# Patient Record
Sex: MALE | Race: BLACK OR AFRICAN AMERICAN | NOT HISPANIC OR LATINO | Employment: PART TIME | URBAN - METROPOLITAN AREA
[De-identification: names, ages, dates, MRNs, and addresses within clinical notes are randomized per-mention and may not be internally consistent; named-entity substitution may affect disease eponyms.]

---

## 2020-03-15 ENCOUNTER — APPOINTMENT (OUTPATIENT)
Dept: CT IMAGING | Facility: HOSPITAL | Age: 40
End: 2020-03-15

## 2020-03-15 ENCOUNTER — HOSPITAL ENCOUNTER (EMERGENCY)
Facility: HOSPITAL | Age: 40
Discharge: HOME | End: 2020-03-16
Attending: EMERGENCY MEDICINE | Admitting: EMERGENCY MEDICINE

## 2020-03-15 DIAGNOSIS — R20.2 PARESTHESIA OF LEFT ARM AND LEG: Primary | ICD-10-CM

## 2020-03-15 LAB
BASOPHILS # BLD AUTO: 0.05 10*3/MM3 (ref 0–0.2)
BASOPHILS NFR BLD AUTO: 1 % (ref 0–1.5)
DEPRECATED RDW RBC AUTO: 42.1 FL (ref 37–54)
EOSINOPHIL # BLD AUTO: 0.17 10*3/MM3 (ref 0–0.4)
EOSINOPHIL NFR BLD AUTO: 3.5 % (ref 0.3–6.2)
ERYTHROCYTE [DISTWIDTH] IN BLOOD BY AUTOMATED COUNT: 13 % (ref 12.3–15.4)
HCT VFR BLD AUTO: 38.5 % (ref 37.5–51)
HGB BLD-MCNC: 12.6 G/DL (ref 13–17.7)
IMM GRANULOCYTES # BLD AUTO: 0.01 10*3/MM3 (ref 0–0.05)
IMM GRANULOCYTES NFR BLD AUTO: 0.2 % (ref 0–0.5)
LYMPHOCYTES # BLD AUTO: 1.38 10*3/MM3 (ref 0.7–3.1)
LYMPHOCYTES NFR BLD AUTO: 28.2 % (ref 19.6–45.3)
MCH RBC QN AUTO: 28.8 PG (ref 26.6–33)
MCHC RBC AUTO-ENTMCNC: 32.7 G/DL (ref 31.5–35.7)
MCV RBC AUTO: 88.1 FL (ref 79–97)
MONOCYTES # BLD AUTO: 0.58 10*3/MM3 (ref 0.1–0.9)
MONOCYTES NFR BLD AUTO: 11.9 % (ref 5–12)
NEUTROPHILS # BLD AUTO: 2.7 10*3/MM3 (ref 1.7–7)
NEUTROPHILS NFR BLD AUTO: 55.2 % (ref 42.7–76)
NRBC BLD AUTO-RTO: 0 /100 WBC (ref 0–0.2)
PLATELET # BLD AUTO: 312 10*3/MM3 (ref 140–450)
PMV BLD AUTO: 9.6 FL (ref 6–12)
RBC # BLD AUTO: 4.37 10*6/MM3 (ref 4.14–5.8)
WBC NRBC COR # BLD: 4.89 10*3/MM3 (ref 3.4–10.8)

## 2020-03-15 PROCEDURE — 99284 EMERGENCY DEPT VISIT MOD MDM: CPT

## 2020-03-15 PROCEDURE — 80053 COMPREHEN METABOLIC PANEL: CPT | Performed by: EMERGENCY MEDICINE

## 2020-03-15 PROCEDURE — 85025 COMPLETE CBC W/AUTO DIFF WBC: CPT | Performed by: EMERGENCY MEDICINE

## 2020-03-15 RX ORDER — SODIUM CHLORIDE 0.9 % (FLUSH) 0.9 %
10 SYRINGE (ML) INJECTION AS NEEDED
Status: DISCONTINUED | OUTPATIENT
Start: 2020-03-15 | End: 2020-03-16 | Stop reason: HOSPADM

## 2020-03-16 ENCOUNTER — APPOINTMENT (OUTPATIENT)
Dept: CT IMAGING | Facility: HOSPITAL | Age: 40
End: 2020-03-16

## 2020-03-16 VITALS
BODY MASS INDEX: 24.38 KG/M2 | HEART RATE: 100 BPM | DIASTOLIC BLOOD PRESSURE: 88 MMHG | SYSTOLIC BLOOD PRESSURE: 121 MMHG | HEIGHT: 72 IN | RESPIRATION RATE: 17 BRPM | WEIGHT: 180 LBS | OXYGEN SATURATION: 100 % | TEMPERATURE: 98.9 F

## 2020-03-16 LAB
ALBUMIN SERPL-MCNC: 4.2 G/DL (ref 3.5–5.2)
ALBUMIN/GLOB SERPL: 2.2 G/DL
ALP SERPL-CCNC: 55 U/L (ref 39–117)
ALT SERPL W P-5'-P-CCNC: 13 U/L (ref 1–41)
ANION GAP SERPL CALCULATED.3IONS-SCNC: 11.4 MMOL/L (ref 5–15)
AST SERPL-CCNC: 18 U/L (ref 1–40)
BILIRUB SERPL-MCNC: <0.2 MG/DL (ref 0.2–1.2)
BUN BLD-MCNC: 12 MG/DL (ref 6–20)
BUN/CREAT SERPL: 14.5 (ref 7–25)
CALCIUM SPEC-SCNC: 8.6 MG/DL (ref 8.6–10.5)
CHLORIDE SERPL-SCNC: 104 MMOL/L (ref 98–107)
CO2 SERPL-SCNC: 24.6 MMOL/L (ref 22–29)
CREAT BLD-MCNC: 0.83 MG/DL (ref 0.76–1.27)
GFR SERPL CREATININE-BSD FRML MDRD: 124 ML/MIN/1.73
GLOBULIN UR ELPH-MCNC: 1.9 GM/DL
GLUCOSE BLD-MCNC: 92 MG/DL (ref 65–99)
POTASSIUM BLD-SCNC: 3.7 MMOL/L (ref 3.5–5.2)
PROT SERPL-MCNC: 6.1 G/DL (ref 6–8.5)
SODIUM BLD-SCNC: 140 MMOL/L (ref 136–145)

## 2020-03-16 PROCEDURE — 70496 CT ANGIOGRAPHY HEAD: CPT

## 2020-03-16 PROCEDURE — 70498 CT ANGIOGRAPHY NECK: CPT

## 2020-03-16 PROCEDURE — 0 IOPAMIDOL PER 1 ML: Performed by: EMERGENCY MEDICINE

## 2020-03-16 RX ADMIN — IOPAMIDOL 95 ML: 755 INJECTION, SOLUTION INTRAVENOUS at 00:42

## 2020-03-16 NOTE — ED TRIAGE NOTES
"Pt ED with c/o headache and L sided tingling/weakness for 2 weeks.  Pt reports sensation difference between extremeties stating L arm feels \"silky\".  No facial droop, slurred speech noted, equal hand grasp bilateral.   "

## 2020-03-16 NOTE — ED PROVIDER NOTES
" EMERGENCY DEPARTMENT ENCOUNTER    CHIEF COMPLAINT  Chief Complaint: Numbness  History given by: Patient  History limited by:   Room Number: 14/14  PMD: Provider, No Known      HPI:  Pt is a 39 y.o. male who presents complaining of L sided extremity numbness that began 2 weeks ago. Prior to onset of sxs, he states that he had a massage done. During the onset of sxs he also reports a L sided HA and had some L sided facial droop at that time. He denies any weakness. Pt describes the numbness as a \"tingling\" sensation. He reports that sxs have improved, but are still present.       PAST MEDICAL HISTORY  Active Ambulatory Problems     Diagnosis Date Noted   • No Active Ambulatory Problems     Resolved Ambulatory Problems     Diagnosis Date Noted   • No Resolved Ambulatory Problems     No Additional Past Medical History       PAST SURGICAL HISTORY  History reviewed. No pertinent surgical history.    FAMILY HISTORY  No family history on file.    SOCIAL HISTORY  Social History     Socioeconomic History   • Marital status: Single     Spouse name: Not on file   • Number of children: Not on file   • Years of education: Not on file   • Highest education level: Not on file       ALLERGIES  Patient has no known allergies.    REVIEW OF SYSTEMS  Review of Systems   Constitutional: Negative for activity change, appetite change and fever.   HENT: Negative for congestion and sore throat.    Eyes: Negative.    Respiratory: Negative for cough and shortness of breath.    Cardiovascular: Negative for chest pain and leg swelling.   Gastrointestinal: Negative for abdominal pain, diarrhea and vomiting.   Endocrine: Negative.    Genitourinary: Negative for decreased urine volume and dysuria.   Musculoskeletal: Negative for neck pain.   Skin: Negative for rash and wound.   Allergic/Immunologic: Negative.    Neurological: Positive for numbness and headaches. Negative for weakness.   Hematological: Negative.    Psychiatric/Behavioral: Negative. "    All other systems reviewed and are negative.      PHYSICAL EXAM  ED Triage Vitals [03/15/20 2248]   Temp Heart Rate Resp BP SpO2   98.9 °F (37.2 °C) 94 16 -- 98 %      Temp src Heart Rate Source Patient Position BP Location FiO2 (%)   Tympanic -- -- -- --       Physical Exam   Constitutional: He is oriented to person, place, and time. No distress.   HENT:   Head: Normocephalic and atraumatic.   Eyes: Pupils are equal, round, and reactive to light. EOM are normal.   Neck: Normal range of motion. Neck supple.   Cardiovascular: Normal rate, regular rhythm and normal heart sounds.   Pulmonary/Chest: Effort normal and breath sounds normal. No respiratory distress.   Abdominal: Soft. There is no tenderness. There is no rebound and no guarding.   Musculoskeletal: Normal range of motion. He exhibits no edema.   Neurological: He is alert and oriented to person, place, and time. He has normal sensation and normal strength.   Skin: Skin is warm and dry.   Psychiatric: Mood and affect normal.   Nursing note and vitals reviewed.      LAB RESULTS  Lab Results (last 24 hours)     Procedure Component Value Units Date/Time    CBC & Differential [[id number]] Collected:  03/15/20 2343    Specimen:  Blood Updated:  03/15/20 4851    Narrative:       The following orders were created for panel order CBC & Differential.  Procedure                               Abnormality         Status                     ---------                               -----------         ------                     CBC Auto Differential[[id number]]        Abnormal            Final result                 Please view results for these tests on the individual orders.    Comprehensive Metabolic Panel [[id number]]  (Abnormal) Collected:  03/15/20 2343    Specimen:  Blood Updated:  03/16/20 0013     Glucose 92 mg/dL      BUN 12 mg/dL      Creatinine 0.83 mg/dL      Sodium 140 mmol/L      Potassium 3.7 mmol/L      Chloride 104 mmol/L      CO2 24.6 mmol/L      Calcium  8.6 mg/dL      Total Protein 6.1 g/dL      Albumin 4.20 g/dL      ALT (SGPT) 13 U/L      AST (SGOT) 18 U/L      Alkaline Phosphatase 55 U/L      Total Bilirubin <0.2 mg/dL      eGFR   Amer 124 mL/min/1.73      Globulin 1.9 gm/dL      A/G Ratio 2.2 g/dL      BUN/Creatinine Ratio 14.5     Anion Gap 11.4 mmol/L     Narrative:       GFR Normal >60  Chronic Kidney Disease <60  Kidney Failure <15      CBC Auto Differential [[id number]]  (Abnormal) Collected:  03/15/20 2343    Specimen:  Blood Updated:  03/15/20 2351     WBC 4.89 10*3/mm3      RBC 4.37 10*6/mm3      Hemoglobin 12.6 g/dL      Hematocrit 38.5 %      MCV 88.1 fL      MCH 28.8 pg      MCHC 32.7 g/dL      RDW 13.0 %      RDW-SD 42.1 fl      MPV 9.6 fL      Platelets 312 10*3/mm3      Neutrophil % 55.2 %      Lymphocyte % 28.2 %      Monocyte % 11.9 %      Eosinophil % 3.5 %      Basophil % 1.0 %      Immature Grans % 0.2 %      Neutrophils, Absolute 2.70 10*3/mm3      Lymphocytes, Absolute 1.38 10*3/mm3      Monocytes, Absolute 0.58 10*3/mm3      Eosinophils, Absolute 0.17 10*3/mm3      Basophils, Absolute 0.05 10*3/mm3      Immature Grans, Absolute 0.01 10*3/mm3      nRBC 0.0 /100 WBC           I ordered the above labs and reviewed the results    RADIOLOGY  CT Angiogram Head   CT Angiogram Neck   CT ANGIOGRAM NECK, CT ANGIOGRAM HEAD     HISTORY: Stenosis, CVA,      COMPARISON: None..     TECHNIQUE:  Radiation dose reduction techniques were utilized, including  automated exposure control and exposure modulation based on body size.  Axial thin-section contrast enhanced CT angiogram images obtained from  the aortic arch through the calvarial vertex utilizing angiographic  technique. Multi projection 3-D MIP reformatted images were supplemented  and reviewed.     CERVICAL CAROTID CT ANGIOGRAM:     FINDINGS: Standard arch configuration. There is no significant carotid  bulb or bifurcation region plaque bilaterally. Vertebral arteries are  patent, left is  dominant.        NASCET criteria utilized in stenosis measurements.     IMPRESSION CERVICAL CAROTID CT ANGIOGRAM:     1. Findings as above, no significant bifurcation region stenosis.           CRANIAL CTA ANGIOGRAM:     FINDINGS:  Basilar artery is widely patent. Both posterior cerebral  arteries originate from the basilar and are unremarkable. The cavernous  internal carotid arteries are widely patent bilaterally. Bifurcations  are normal. Bilateral A-1 segments are ample and widely patent. M-1  branch vessels are widely patent. There is normal filling of the  trifurcation vessels. .          IMPRESSION CRANIAL CT ANGIOGRAM:    1. No significant stenosis or aneurysm identified.                I ordered the above noted radiological studies. Interpreted by radiologist. Reviewed by me in PACS.       PROCEDURES  Procedures      PROGRESS AND CONSULTS     11:19 PM  Pt is not a tPA candidate. Sxs began 2 weeks ago. CTA head and neck ordered. Blood work ordered.    1:46 AM  Rechecked with pt. Informed the pt of his imaging showing nothing remarkable and plan to discharge. Pt understands and agrees with plan. All concerns were addressed.        MEDICAL DECISION MAKING  Results were reviewed/discussed with the patient and they were also made aware of online access. Pt also made aware that some labs, such as cultures, will not be resulted during ER visit and follow up with PMD is necessary.     MDM  Number of Diagnoses or Management Options     Amount and/or Complexity of Data Reviewed  Review and summarize past medical records: yes (No previous records)           DIAGNOSIS  Final diagnoses:   Paresthesia of left arm and leg       DISPOSITION  DISCHARGE    Patient discharged in stable condition.    Reviewed implications of results, diagnosis, meds, responsibility to follow up, warning signs and symptoms of possible worsening, potential complications and reasons to return to ER.    Patient/Family voiced understanding of above  instructions.    Discussed plan for discharge, as there is no emergent indication for admission. Patient referred to primary care provider for BP management due to today's BP. Pt/family is agreeable and understands need for follow up and repeat testing.  Pt is aware that discharge does not mean that nothing is wrong but it indicates no emergency is present that requires admission and they must continue care with follow-up as given below or physician of their choice.     FOLLOW-UP  PATIENT LIAISON [facility] [address] [phone]  Call in 1 day  For Primary Physician follow-up    [facility] NEUROLOG[facility]  [address]  [place] [address] [phone]  Call   For Neurology follow-up, If symptoms worsen         Medication List      No changes were made to your prescriptions during this visit.           Latest Documented Vital Signs:  As of 06:19  BP- 121/88 HR- 100 Temp- 98.9 °F (37.2 °C) (Tympanic) O2 sat- 100%    --  Documentation assistance provided by celine Pineda for Dr Hannon.  Information recorded by the scribe was done at my direction and has been verified and validated by me.         Jose F Pineda  03/16/20 0147       Satish Hannon MD  03/16/20 0619

## 2020-12-05 ENCOUNTER — HOSPITAL ENCOUNTER (OUTPATIENT)
Dept: URGENT CARE | Facility: CLINIC | Age: 40
Discharge: HOME | End: 2020-12-05
Attending: FAMILY MEDICINE

## 2021-01-14 ENCOUNTER — HOSPITAL ENCOUNTER (OUTPATIENT)
Dept: URGENT CARE | Facility: CLINIC | Age: 41
Discharge: HOME | End: 2021-01-14
Attending: PHYSICIAN ASSISTANT

## 2022-07-06 ENCOUNTER — HOSPITAL ENCOUNTER (EMERGENCY)
Facility: HOSPITAL | Age: 42
Discharge: HOME | End: 2022-07-06
Attending: EMERGENCY MEDICINE | Admitting: EMERGENCY MEDICINE

## 2022-07-06 ENCOUNTER — HOSPITAL ENCOUNTER (INPATIENT)
Facility: HOSPITAL | Age: 42
LOS: 2 days | Discharge: HOME | End: 2022-07-09
Attending: EMERGENCY MEDICINE | Admitting: INTERNAL MEDICINE

## 2022-07-06 VITALS
BODY MASS INDEX: 28.23 KG/M2 | HEIGHT: 74 IN | HEART RATE: 91 BPM | DIASTOLIC BLOOD PRESSURE: 83 MMHG | WEIGHT: 220 LBS | TEMPERATURE: 98.2 F | SYSTOLIC BLOOD PRESSURE: 142 MMHG | RESPIRATION RATE: 20 BRPM | OXYGEN SATURATION: 98 %

## 2022-07-06 DIAGNOSIS — L25.8: Primary | ICD-10-CM

## 2022-07-06 DIAGNOSIS — Z78.9 DECREASED ACTIVITIES OF DAILY LIVING (ADL): ICD-10-CM

## 2022-07-06 DIAGNOSIS — F29 PSYCHOSIS, UNSPECIFIED PSYCHOSIS TYPE: ICD-10-CM

## 2022-07-06 DIAGNOSIS — R26.2 DIFFICULTY WALKING: ICD-10-CM

## 2022-07-06 DIAGNOSIS — M62.82 NON-TRAUMATIC RHABDOMYOLYSIS: ICD-10-CM

## 2022-07-06 DIAGNOSIS — F15.10 AMPHETAMINE ABUSE: Primary | ICD-10-CM

## 2022-07-06 LAB
ALBUMIN SERPL-MCNC: 4.5 G/DL (ref 3.5–5.2)
ALBUMIN/GLOB SERPL: 1.7 G/DL
ALP SERPL-CCNC: 72 U/L (ref 39–117)
ALT SERPL W P-5'-P-CCNC: 27 U/L (ref 1–41)
ANION GAP SERPL CALCULATED.3IONS-SCNC: 14 MMOL/L (ref 5–15)
APAP SERPL-MCNC: <5 MCG/ML (ref 0–30)
AST SERPL-CCNC: 44 U/L (ref 1–40)
BASOPHILS # BLD AUTO: 0.06 10*3/MM3 (ref 0–0.2)
BASOPHILS NFR BLD AUTO: 0.6 % (ref 0–1.5)
BILIRUB SERPL-MCNC: 0.7 MG/DL (ref 0–1.2)
BUN SERPL-MCNC: 18 MG/DL (ref 6–20)
BUN/CREAT SERPL: 14.1 (ref 7–25)
CALCIUM SPEC-SCNC: 9.6 MG/DL (ref 8.6–10.5)
CHLORIDE SERPL-SCNC: 106 MMOL/L (ref 98–107)
CK SERPL-CCNC: 1782 U/L (ref 20–200)
CO2 SERPL-SCNC: 23 MMOL/L (ref 22–29)
CREAT SERPL-MCNC: 1.28 MG/DL (ref 0.76–1.27)
DEPRECATED RDW RBC AUTO: 41.4 FL (ref 37–54)
EGFRCR SERPLBLD CKD-EPI 2021: 71.2 ML/MIN/1.73
EOSINOPHIL # BLD AUTO: 0.05 10*3/MM3 (ref 0–0.4)
EOSINOPHIL NFR BLD AUTO: 0.5 % (ref 0.3–6.2)
ERYTHROCYTE [DISTWIDTH] IN BLOOD BY AUTOMATED COUNT: 13.4 % (ref 12.3–15.4)
ETHANOL BLD-MCNC: <10 MG/DL (ref 0–10)
ETHANOL UR QL: <0.01 %
GLOBULIN UR ELPH-MCNC: 2.7 GM/DL
GLUCOSE SERPL-MCNC: 107 MG/DL (ref 65–99)
HCT VFR BLD AUTO: 36.1 % (ref 37.5–51)
HGB BLD-MCNC: 12.3 G/DL (ref 13–17.7)
HOLD SPECIMEN: NORMAL
HOLD SPECIMEN: NORMAL
IMM GRANULOCYTES # BLD AUTO: 0.03 10*3/MM3 (ref 0–0.05)
IMM GRANULOCYTES NFR BLD AUTO: 0.3 % (ref 0–0.5)
LYMPHOCYTES # BLD AUTO: 1.01 10*3/MM3 (ref 0.7–3.1)
LYMPHOCYTES NFR BLD AUTO: 10.2 % (ref 19.6–45.3)
MCH RBC QN AUTO: 28.8 PG (ref 26.6–33)
MCHC RBC AUTO-ENTMCNC: 34.1 G/DL (ref 31.5–35.7)
MCV RBC AUTO: 84.5 FL (ref 79–97)
MONOCYTES # BLD AUTO: 0.84 10*3/MM3 (ref 0.1–0.9)
MONOCYTES NFR BLD AUTO: 8.5 % (ref 5–12)
NEUTROPHILS NFR BLD AUTO: 7.88 10*3/MM3 (ref 1.7–7)
NEUTROPHILS NFR BLD AUTO: 79.9 % (ref 42.7–76)
NRBC BLD AUTO-RTO: 0 /100 WBC (ref 0–0.2)
PLATELET # BLD AUTO: 286 10*3/MM3 (ref 140–450)
PMV BLD AUTO: 10.6 FL (ref 6–12)
POTASSIUM SERPL-SCNC: 3.8 MMOL/L (ref 3.5–5.2)
PROT SERPL-MCNC: 7.2 G/DL (ref 6–8.5)
RBC # BLD AUTO: 4.27 10*6/MM3 (ref 4.14–5.8)
SALICYLATES SERPL-MCNC: <0.3 MG/DL
SODIUM SERPL-SCNC: 143 MMOL/L (ref 136–145)
WBC NRBC COR # BLD: 9.87 10*3/MM3 (ref 3.4–10.8)
WHOLE BLOOD HOLD COAG: NORMAL
WHOLE BLOOD HOLD SPECIMEN: NORMAL

## 2022-07-06 PROCEDURE — 85025 COMPLETE CBC W/AUTO DIFF WBC: CPT | Performed by: NURSE PRACTITIONER

## 2022-07-06 PROCEDURE — 99284 EMERGENCY DEPT VISIT MOD MDM: CPT

## 2022-07-06 PROCEDURE — 99283 EMERGENCY DEPT VISIT LOW MDM: CPT

## 2022-07-06 PROCEDURE — 80143 DRUG ASSAY ACETAMINOPHEN: CPT | Performed by: NURSE PRACTITIONER

## 2022-07-06 PROCEDURE — 82550 ASSAY OF CK (CPK): CPT | Performed by: NURSE PRACTITIONER

## 2022-07-06 PROCEDURE — 80053 COMPREHEN METABOLIC PANEL: CPT | Performed by: NURSE PRACTITIONER

## 2022-07-06 PROCEDURE — 82077 ASSAY SPEC XCP UR&BREATH IA: CPT | Performed by: NURSE PRACTITIONER

## 2022-07-06 PROCEDURE — 80179 DRUG ASSAY SALICYLATE: CPT | Performed by: NURSE PRACTITIONER

## 2022-07-06 RX ORDER — SODIUM CHLORIDE 0.9 % (FLUSH) 0.9 %
10 SYRINGE (ML) INJECTION AS NEEDED
Status: DISCONTINUED | OUTPATIENT
Start: 2022-07-06 | End: 2022-07-09 | Stop reason: HOSPADM

## 2022-07-06 NOTE — ED PROVIDER NOTES
Subjective   Willow Khan is a 41 y.o. male who presents to the emergency department today with complaints of decontamination. Pt states he was working in a garden when he got a substance over most of his body. He reports swelling to his hands. Upon arrival to ED pt was taken to the decon room where he showered for 20 minutes. Upon evaluation he is feeling better with resolved sx. There are no complaints at this time.       History provided by:  Patient   used: No        Review of Systems   Constitutional: Negative for chills and fever.   HENT: Negative for congestion, rhinorrhea and sore throat.    Eyes: Negative for pain and visual disturbance.   Respiratory: Negative for apnea, cough, chest tightness and shortness of breath.    Cardiovascular: Negative for chest pain and palpitations.   Gastrointestinal: Negative for abdominal pain, diarrhea, nausea and vomiting.   Genitourinary: Negative for difficulty urinating and dysuria.   Musculoskeletal: Negative for joint swelling and myalgias.   Skin: Negative for color change.   Neurological: Negative for seizures and headaches.   Psychiatric/Behavioral: Negative.    All other systems reviewed and are negative.      History reviewed. No pertinent past medical history.    No Known Allergies    History reviewed. No pertinent surgical history.    History reviewed. No pertinent family history.    Social History     Socioeconomic History   • Marital status: Single   Tobacco Use   • Smoking status: Current Every Day Smoker     Packs/day: 1.00     Types: Cigarettes   Substance and Sexual Activity   • Alcohol use: Yes         Objective   Physical Exam  Vitals and nursing note reviewed.   Constitutional:       General: He is not in acute distress.     Appearance: Normal appearance. He is not toxic-appearing.   HENT:      Head: Normocephalic and atraumatic.      Jaw: There is normal jaw occlusion.   Eyes:      General: Lids are normal.      Extraocular  Movements: Extraocular movements intact.      Conjunctiva/sclera: Conjunctivae normal.      Pupils: Pupils are equal, round, and reactive to light.   Cardiovascular:      Rate and Rhythm: Normal rate and regular rhythm.      Pulses: Normal pulses.      Heart sounds: Normal heart sounds.   Pulmonary:      Effort: Pulmonary effort is normal. No respiratory distress.      Breath sounds: Normal breath sounds. No wheezing or rhonchi.   Abdominal:      General: Abdomen is flat.      Palpations: Abdomen is soft.      Tenderness: There is no abdominal tenderness. There is no guarding or rebound.   Musculoskeletal:         General: Normal range of motion.      Cervical back: Normal range of motion and neck supple.      Right lower leg: No edema.      Left lower leg: No edema.   Skin:     General: Skin is warm and dry.   Neurological:      Mental Status: He is alert and oriented to person, place, and time. Mental status is at baseline.   Psychiatric:         Mood and Affect: Mood normal.         Procedures         ED Course                                  MDM  Number of Diagnoses or Management Options  Contact dermatitis due to alkali  Diagnosis management comments: In summary this is a 41-year-old male who presents emerged department status post getting Lye on his arms and legs.  He does not state what he was using this for.  He was decontaminated and has no rash or burns present on his extremities.  Otherwise patient has no complaints.  Very strict return to ER and follow-up instructions have been provided to the patient.        Final diagnoses:   Contact dermatitis due to alkali       Documentation assistance provided by celine Ackerman.  Information recorded by the celine was done at my direction and has been verified and validated by me.     Maria Guadalupe Ackerman  07/06/22 7799       Nicolas Parker MD  07/06/22 4005

## 2022-07-07 ENCOUNTER — APPOINTMENT (OUTPATIENT)
Dept: GENERAL RADIOLOGY | Facility: HOSPITAL | Age: 42
End: 2022-07-07

## 2022-07-07 PROBLEM — M62.82 RHABDOMYOLYSIS: Status: ACTIVE | Noted: 2022-07-07

## 2022-07-07 LAB
AMPHET+METHAMPHET UR QL: POSITIVE
BARBITURATES UR QL SCN: NEGATIVE
BENZODIAZ UR QL SCN: NEGATIVE
BILIRUB UR QL STRIP: NEGATIVE
CANNABINOIDS SERPL QL: NEGATIVE
CK SERPL-CCNC: 3362 U/L (ref 20–200)
CLARITY UR: CLEAR
COCAINE UR QL: NEGATIVE
COLOR UR: YELLOW
GLUCOSE UR STRIP-MCNC: NEGATIVE MG/DL
HGB UR QL STRIP.AUTO: NEGATIVE
KETONES UR QL STRIP: ABNORMAL
LEUKOCYTE ESTERASE UR QL STRIP.AUTO: NEGATIVE
METHADONE UR QL SCN: NEGATIVE
NITRITE UR QL STRIP: NEGATIVE
OPIATES UR QL: NEGATIVE
OXYCODONE UR QL SCN: NEGATIVE
PH UR STRIP.AUTO: 6 [PH] (ref 5–8)
PROT UR QL STRIP: ABNORMAL
SP GR UR STRIP: >=1.03 (ref 1–1.03)
UROBILINOGEN UR QL STRIP: ABNORMAL

## 2022-07-07 PROCEDURE — 73560 X-RAY EXAM OF KNEE 1 OR 2: CPT

## 2022-07-07 PROCEDURE — 81003 URINALYSIS AUTO W/O SCOPE: CPT | Performed by: NURSE PRACTITIONER

## 2022-07-07 PROCEDURE — 80307 DRUG TEST PRSMV CHEM ANLYZR: CPT | Performed by: NURSE PRACTITIONER

## 2022-07-07 PROCEDURE — 25010000002 HALOPERIDOL LACTATE PER 5 MG: Performed by: NURSE PRACTITIONER

## 2022-07-07 PROCEDURE — 82550 ASSAY OF CK (CPK): CPT | Performed by: NURSE PRACTITIONER

## 2022-07-07 PROCEDURE — 99223 1ST HOSP IP/OBS HIGH 75: CPT | Performed by: INTERNAL MEDICINE

## 2022-07-07 RX ORDER — LORAZEPAM 2 MG/1
2 TABLET ORAL EVERY 4 HOURS PRN
Status: DISCONTINUED | OUTPATIENT
Start: 2022-07-07 | End: 2022-07-09 | Stop reason: HOSPADM

## 2022-07-07 RX ORDER — SODIUM CHLORIDE 9 MG/ML
150 INJECTION, SOLUTION INTRAVENOUS CONTINUOUS
Status: ACTIVE | OUTPATIENT
Start: 2022-07-07 | End: 2022-07-08

## 2022-07-07 RX ORDER — ONDANSETRON 2 MG/ML
4 INJECTION INTRAMUSCULAR; INTRAVENOUS EVERY 6 HOURS PRN
Status: DISCONTINUED | OUTPATIENT
Start: 2022-07-07 | End: 2022-07-09 | Stop reason: HOSPADM

## 2022-07-07 RX ORDER — ACETAMINOPHEN 325 MG/1
650 TABLET ORAL EVERY 4 HOURS PRN
Status: DISCONTINUED | OUTPATIENT
Start: 2022-07-07 | End: 2022-07-09 | Stop reason: HOSPADM

## 2022-07-07 RX ORDER — HALOPERIDOL 5 MG/ML
5 INJECTION INTRAMUSCULAR EVERY 4 HOURS PRN
Status: DISCONTINUED | OUTPATIENT
Start: 2022-07-07 | End: 2022-07-09 | Stop reason: HOSPADM

## 2022-07-07 RX ORDER — HALOPERIDOL 5 MG/ML
5 INJECTION INTRAMUSCULAR ONCE
Status: COMPLETED | OUTPATIENT
Start: 2022-07-07 | End: 2022-07-07

## 2022-07-07 RX ORDER — LORAZEPAM 2 MG/ML
2 INJECTION INTRAMUSCULAR EVERY 4 HOURS PRN
Status: DISCONTINUED | OUTPATIENT
Start: 2022-07-07 | End: 2022-07-09 | Stop reason: HOSPADM

## 2022-07-07 RX ORDER — HALOPERIDOL 5 MG/1
5 TABLET ORAL EVERY 4 HOURS PRN
Status: DISCONTINUED | OUTPATIENT
Start: 2022-07-07 | End: 2022-07-09 | Stop reason: HOSPADM

## 2022-07-07 RX ORDER — ENOXAPARIN SODIUM 100 MG/ML
40 INJECTION SUBCUTANEOUS DAILY
Status: DISCONTINUED | OUTPATIENT
Start: 2022-07-08 | End: 2022-07-09 | Stop reason: HOSPADM

## 2022-07-07 RX ORDER — RISPERIDONE 3 MG/1
3 TABLET ORAL NIGHTLY
Status: DISCONTINUED | OUTPATIENT
Start: 2022-07-07 | End: 2022-07-09 | Stop reason: HOSPADM

## 2022-07-07 RX ORDER — DIPHENHYDRAMINE HCL 50 MG
50 CAPSULE ORAL EVERY 4 HOURS PRN
Status: DISCONTINUED | OUTPATIENT
Start: 2022-07-07 | End: 2022-07-09 | Stop reason: HOSPADM

## 2022-07-07 RX ORDER — DIPHENHYDRAMINE HYDROCHLORIDE 50 MG/ML
50 INJECTION INTRAMUSCULAR; INTRAVENOUS EVERY 4 HOURS PRN
Status: DISCONTINUED | OUTPATIENT
Start: 2022-07-07 | End: 2022-07-09 | Stop reason: HOSPADM

## 2022-07-07 RX ADMIN — SODIUM CHLORIDE 150 ML/HR: 9 INJECTION, SOLUTION INTRAVENOUS at 16:15

## 2022-07-07 RX ADMIN — SODIUM CHLORIDE 2000 ML: 9 INJECTION, SOLUTION INTRAVENOUS at 01:35

## 2022-07-07 RX ADMIN — MUPIROCIN 1 APPLICATION: 20 OINTMENT TOPICAL at 21:37

## 2022-07-07 RX ADMIN — HALOPERIDOL LACTATE 5 MG: 5 INJECTION, SOLUTION INTRAMUSCULAR at 01:10

## 2022-07-07 RX ADMIN — SODIUM CHLORIDE 150 ML/HR: 9 INJECTION, SOLUTION INTRAVENOUS at 21:34

## 2022-07-07 RX ADMIN — RISPERIDONE 3 MG: 3 TABLET ORAL at 21:33

## 2022-07-07 RX ADMIN — MUPIROCIN 1 APPLICATION: 20 OINTMENT TOPICAL at 01:34

## 2022-07-07 RX ADMIN — SODIUM CHLORIDE 150 ML/HR: 9 INJECTION, SOLUTION INTRAVENOUS at 08:55

## 2022-07-07 NOTE — PAYOR COMM NOTE
"Willow Khan (41 y.o. Male)             Date of Birth   1980    Social Security Number       Address   [address] [place] [address]  [place] [address]    Home Phone   [phone]    MRN   [id number]       Restorationist   None    Marital Status   Single                            Admission Date   7/6/22    Admission Type   Emergency    Admitting Provider   Girish Painting MD    Attending Provider   Girish Painting MD    Department, Room/Bed   [address] JOINT [place], 235/1       Discharge Date       Discharge Disposition       Discharge Destination                               Attending Provider: Girish Painting MD    Allergies: No Known Allergies    Isolation: None   Infection: None   Code Status: CPR   Advance Care Planning Activity    Ht: 185.4 cm (73\")   Wt: 90.4 kg (199 lb 4.7 oz)    Admission Cmt: None   Principal Problem: None                Active Insurance as of 7/6/2022     Primary Coverage     Payor Plan Insurance Group Employer/Plan Group    [place] [id number]     Payor Plan Address Payor Plan Phone Number Payor Plan Fax Number Effective Dates    [employer] [address] [address] [phone]  1/1/2021 - None Entered    [facility] [id number]       Subscriber Name Subscriber Birth Date Member ID       WILLOW KHAN 1980 [id number]                 Emergency Contacts          No emergency contacts on file.              Musculoskeletal Disease GRG - Clinical Indications for Admission to Inpatient Care by Bev Interiano         Met: Reviewed on 7/7/2022 by Bev Interiano       Created Using Review Status Review Entered   wywyia® Completed 7/7/2022 09:45       Criteria Set Name - Subset   Musculoskeletal Disease GRG - Clinical Indications for Admission to Inpatient Care      Criteria Review      Clinical Indications for Admission to Inpatient Care    Most Recent : Bev Interiano Most Recent Date: 7/7/2022 09:45:11 EDST    (X) Hospital admission " is needed for appropriate care of the patient because of  1 or more  of    the following :       (X) Rhabdomyolysis and  1 or more  of the following  (61) (62):          (X) Need for intravenous hydration despite observation care (as appropriate)          7/7/2022 09:45:11 EDST by Bev Interiano            Sodium chloride 0.9% bolus 2,000ml. Sodium Chloride 0.9% infusion 150ml/hr, Intravenous, Continuous, Starting on 7/7/22 at 0745, for 1 day.          (X) Altered mental status that is severe or persistent          7/7/2022 09:45:11 EDST by Bev Interiano            He is somewhat difficult to understand when communicating due to his current state and probable intoxication.  He is rambling when he speaks and is not making sense with his conversation. He does currently use meth, last used today.         Waldo: JAM [id number] Tax ID [id number]  Problem List           Codes Noted - Resolved       Hospital    Rhabdomyolysis ICD-10-CM: M62.82  ICD-9-CM: 728.88 7/7/2022 - Present          History & Physical    No notes of this type exist for this encounter.            Emergency Department Notes      Marianna Flowers APRN at 07/06/22 2317          Time: 07:41 EDT  Arrived by: walked in  Chief Complaint: psych eval  History provided by: patient  History is limited by: N/A    History of Present Illness:  Patient is a 41 y.o. year old male that presents to the emergency department with     This is a 41-year-old male patient who presents today to the emergency department for psych evaluation and medical clearance.  He is somewhat difficult to understand when communicating due to his current state and probable intoxication.  He is rambling when he speaks and is not making sense with his conversation.  During the conversation he did admit that he is homeless and living on the street and that today he tried to jump the fence at [place].  He made a comment that there was some sort of Oriental orthodox ritual that involved some sort of  sacrifice but unable to describe the ritual he was speaking of.  He was able to tell me that he does currently use meth and typically will smoke it.  He states that the last use was today.  He is tearful when we are talking and says that he is scared but he is unable to explain why he is scared.  He denies any suicidal or homicidal ideations.    He does complain of right knee pain and bilateral hand pain where he fell trying to climb the post today at [place].  He complains of abrasions and bleeding to the right knee as well as to his hands.  He denies any neck or back pain.      History provided by:  Patient  History limited by:  Psychiatric disorder   used: No    Psychiatric Evaluation  Severity:  Unable to specify  Onset quality:  Unable to specify  Chronicity:  New  Associated symptoms: fatigue    Associated symptoms: no abdominal pain, no chest pain, no cough, no fever, no nausea, no shortness of breath and no vomiting    Dehydration  Associated symptoms: fatigue    Associated symptoms: no abdominal pain, no chest pain, no cough, no fever, no nausea, no shortness of breath and no vomiting            Similar Symptoms Previously: No  Recently seen: No      Patient Care Team  Primary Care Provider: Unknown    Past Medical History:     No Known Allergies  No past medical history on file.  No past surgical history on file.  No family history on file.    Home Medications:  Prior to Admission medications    Not on File        Social History:   PT  has no history on file for tobacco use, alcohol use, and drug use.    Record Review:  I have reviewed the patient's records in [employer].     Review of Systems  Review of Systems   Constitutional: Positive for fatigue. Negative for appetite change and fever.   HENT: Negative.    Eyes: Negative.    Respiratory: Negative.  Negative for cough and shortness of breath.    Cardiovascular: Negative.  Negative for chest pain.   Gastrointestinal: Negative for  "abdominal pain, nausea and vomiting.   Endocrine: Negative.    Genitourinary: Negative.    Musculoskeletal:        Right knee pain   Allergic/Immunologic: Negative.    Neurological: Negative.    Psychiatric/Behavioral: Positive for agitation, behavioral problems, confusion and hallucinations. The patient is nervous/anxious.         Racing thoughts, hallucinations that someone is trying to kill him        Physical Exam  /82   Pulse 82   Temp 98.3 °F (36.8 °C) (Oral)   Resp 16   Ht 185.4 cm (73\")   Wt 90.4 kg (199 lb 4.7 oz)   SpO2 94%   BMI 26.29 kg/m²     Physical Exam  Vitals and nursing note reviewed.   Constitutional:       General: He is not in acute distress.     Appearance: Normal appearance. He is not toxic-appearing.   HENT:      Head: Normocephalic and atraumatic.      Nose: Nose normal.      Mouth/Throat:      Mouth: Mucous membranes are moist.   Eyes:      Extraocular Movements: Extraocular movements intact.      Pupils: Pupils are equal, round, and reactive to light.   Cardiovascular:      Rate and Rhythm: Normal rate and regular rhythm.      Pulses: Normal pulses.      Heart sounds: Normal heart sounds.   Pulmonary:      Effort: Pulmonary effort is normal.      Breath sounds: Normal breath sounds.   Abdominal:      General: Bowel sounds are normal.      Palpations: Abdomen is soft.      Tenderness: There is no abdominal tenderness.   Musculoskeletal:         General: Tenderness (Right knee tenderness.) present. No swelling or deformity. Normal range of motion.      Cervical back: Normal range of motion.   Skin:     General: Skin is warm and dry.      Findings: Lesion present.      Comments: Multiple abrasions to the right knee noted with bleeding.      Abrasions to the palmar surfaces of bilateral hands.  Bleeding controlled.   Neurological:      General: No focal deficit present.      Mental Status: He is alert and oriented to person, place, and time.   Psychiatric:      Comments: Patient " "is paranoid.  He is anxious and restless.  His conversation is not completely making sense.  Racing thoughts having hallucinations that someone is chasing him trying to kill him.          ED Course  /82   Pulse 82   Temp 98.3 °F (36.8 °C) (Oral)   Resp 16   Ht 185.4 cm (73\")   Wt 90.4 kg (199 lb 4.7 oz)   SpO2 94%   BMI 26.29 kg/m²   Results for orders placed or performed during the hospital encounter of 07/06/22   Comprehensive Metabolic Panel    Specimen: Blood   Result Value Ref Range    Glucose 107 (H) 65 - 99 mg/dL    BUN 18 6 - 20 mg/dL    Creatinine 1.28 (H) 0.76 - 1.27 mg/dL    Sodium 143 136 - 145 mmol/L    Potassium 3.8 3.5 - 5.2 mmol/L    Chloride 106 98 - 107 mmol/L    CO2 23.0 22.0 - 29.0 mmol/L    Calcium 9.6 8.6 - 10.5 mg/dL    Total Protein 7.2 6.0 - 8.5 g/dL    Albumin 4.50 3.50 - 5.20 g/dL    ALT (SGPT) 27 1 - 41 U/L    AST (SGOT) 44 (H) 1 - 40 U/L    Alkaline Phosphatase 72 39 - 117 U/L    Total Bilirubin 0.7 0.0 - 1.2 mg/dL    Globulin 2.7 gm/dL    A/G Ratio 1.7 g/dL    BUN/Creatinine Ratio 14.1 7.0 - 25.0    Anion Gap 14.0 5.0 - 15.0 mmol/L    eGFR 71.2 >60.0 mL/min/1.73   Acetaminophen Level    Specimen: Blood   Result Value Ref Range    Acetaminophen <5.0 0.0 - 30.0 mcg/mL   Ethanol    Specimen: Blood   Result Value Ref Range    Ethanol <10 0 - 10 mg/dL    Ethanol % <0.010 %   Salicylate Level    Specimen: Blood   Result Value Ref Range    Salicylate <0.3 <=30.0 mg/dL   Urine Drug Screen - Urine, Clean Catch    Specimen: Urine, Clean Catch   Result Value Ref Range    Amphet/Methamphet, Screen Positive (A) Negative    Barbiturates Screen, Urine Negative Negative    Benzodiazepine Screen, Urine Negative Negative    Cocaine Screen, Urine Negative Negative    Opiate Screen Negative Negative    THC, Screen, Urine Negative Negative    Methadone Screen, Urine Negative Negative    Oxycodone Screen, Urine Negative Negative   CK    Specimen: Blood   Result Value Ref Range    Creatine Kinase " 1,782 (H) 20 - 200 U/L   CBC Auto Differential    Specimen: Blood   Result Value Ref Range    WBC 9.87 3.40 - 10.80 10*3/mm3    RBC 4.27 4.14 - 5.80 10*6/mm3    Hemoglobin 12.3 (L) 13.0 - 17.7 g/dL    Hematocrit 36.1 (L) 37.5 - 51.0 %    MCV 84.5 79.0 - 97.0 fL    MCH 28.8 26.6 - 33.0 pg    MCHC 34.1 31.5 - 35.7 g/dL    RDW 13.4 12.3 - 15.4 %    RDW-SD 41.4 37.0 - 54.0 fl    MPV 10.6 6.0 - 12.0 fL    Platelets 286 140 - 450 10*3/mm3    Neutrophil % 79.9 (H) 42.7 - 76.0 %    Lymphocyte % 10.2 (L) 19.6 - 45.3 %    Monocyte % 8.5 5.0 - 12.0 %    Eosinophil % 0.5 0.3 - 6.2 %    Basophil % 0.6 0.0 - 1.5 %    Immature Grans % 0.3 0.0 - 0.5 %    Neutrophils, Absolute 7.88 (H) 1.70 - 7.00 10*3/mm3    Lymphocytes, Absolute 1.01 0.70 - 3.10 10*3/mm3    Monocytes, Absolute 0.84 0.10 - 0.90 10*3/mm3    Eosinophils, Absolute 0.05 0.00 - 0.40 10*3/mm3    Basophils, Absolute 0.06 0.00 - 0.20 10*3/mm3    Immature Grans, Absolute 0.03 0.00 - 0.05 10*3/mm3    nRBC 0.0 0.0 - 0.2 /100 WBC   Urinalysis With Microscopic If Indicated (No Culture) - Urine, Clean Catch    Specimen: Urine, Clean Catch   Result Value Ref Range    Color, UA Yellow Yellow, Straw    Appearance, UA Clear Clear    pH, UA 6.0 5.0 - 8.0    Specific Gravity, UA >=1.030 1.005 - 1.030    Glucose, UA Negative Negative    Ketones, UA 15 mg/dL (1+) (A) Negative    Bilirubin, UA Negative Negative    Blood, UA Negative Negative    Protein, UA Trace (A) Negative    Leuk Esterase, UA Negative Negative    Nitrite, UA Negative Negative    Urobilinogen, UA 2.0 E.U./dL (A) 0.2 - 1.0 E.U./dL   CK    Specimen: Arm, Left; Blood   Result Value Ref Range    Creatine Kinase 3,362 (H) 20 - 200 U/L   Green Top (Gel)   Result Value Ref Range    Extra Tube Hold for add-ons.    Lavender Top   Result Value Ref Range    Extra Tube hold for add-on    Gold Top - SST   Result Value Ref Range    Extra Tube Hold for add-ons.    Light Blue Top   Result Value Ref Range    Extra Tube Hold for  add-ons.      Medications   sodium chloride 0.9 % flush 10 mL (has no administration in time range)   mupirocin (BACTROBAN) 2 % ointment 1 application (1 application Topical Given 7/7/22 0134)   sodium chloride 0.9 % infusion (has no administration in time range)   sodium chloride 0.9 % bolus 2,000 mL (0 mL Intravenous Stopped 7/7/22 0300)   haloperidol lactate (HALDOL) injection 5 mg (5 mg Intravenous Given 7/7/22 0110)     XR Knee 1 or 2 View Right    Result Date: 7/7/2022  Narrative: PROCEDURE: XR KNEE 1 OR 2 VW RIGHT  (SINGLE VIEW)  COMPARISON: None.  INDICATIONS: FALL TODAY RIGHT KNEE PAIN.  FINDINGS:  A single nonweightbearing AP view of the right knee is provided for review.  No acute fracture or acute malalignment is identified on this single view.  The patient was uncooperative for the study per the performing x-ray technologist.  Therefore, a complete exam was unable to be performed.  If symptoms or clinical concerns persist, consider imaging follow-up.      Impression:   No acute fracture or acute malalignment is identified on this single AP view of the right knee.  Please see above comments for further detail.     COMMENT:  Part of this note is an electronic transcription of spoken language to printed text. The electronic translation/transcription may permit erroneous, or at times, nonsensical (or even sensical) words or phrases to be inadvertently transcribed or omitted; this  has reviewed the note for such errors (as well as additional errors); however, some may still exist.  LORI ZHANG JR, MD       Electronically Signed and Approved By: LORI ZHANG JR, MD on 7/07/2022 at 2:36                Medical Decision Making:                     MDM  Number of Diagnoses or Management Options  Amphetamine abuse (HCC)  Non-traumatic rhabdomyolysis  Psychosis, unspecified psychosis type (HCC)  Diagnosis management comments: Patient with psychosis likely drug induced will be admitted for safety and  stabilization since found to have rhabdomyolysis       Amount and/or Complexity of Data Reviewed  Clinical lab tests: reviewed and ordered  Tests in the radiology section of CPT®: reviewed and ordered  Tests in the medicine section of CPT®: ordered and reviewed    Risk of Complications, Morbidity, and/or Mortality  Presenting problems: moderate  Diagnostic procedures: moderate  Management options: low    Patient Progress  Patient progress: stable       Final diagnoses:   Amphetamine abuse (HCC)   Non-traumatic rhabdomyolysis   Psychosis, unspecified psychosis type (HCC)        Disposition:  ED Disposition     ED Disposition   Decision to Admit    Condition   --    Comment   --              Marianna Flowers APRN  07/07/22 0741      Electronically signed by Marianna Flowers APRN at 07/07/22 0741       Vital Signs (last day)     Date/Time Temp Temp src Pulse Resp BP Patient Position SpO2    07/07/22 0848 97.8 (36.6) Oral 73 16 130/88 Lying 100    07/07/22 0814 -- -- 79 -- -- -- 95    07/07/22 0645 -- -- 82 -- -- -- 94    07/07/22 0630 -- -- 82 -- -- -- 98    07/07/22 0625 -- -- 81 -- -- -- 98    07/07/22 0615 -- -- 83 -- -- -- 96    07/07/22 0600 -- -- 83 -- -- -- 98    07/07/22 0550 -- -- 82 -- -- -- 97    07/07/22 0530 -- -- 84 -- -- -- 97    07/07/22 0515 -- -- 81 -- -- -- 96    07/07/22 0500 -- -- 86 -- -- -- 98    07/07/22 0430 -- -- 86 -- -- -- 95    07/07/22 0415 -- -- 90 -- -- -- 97    07/07/22 0400 -- -- 92 -- -- -- 96    07/07/22 0330 -- -- 85 -- -- -- 99    07/07/22 0315 -- -- 90 -- -- -- 97    07/07/22 0300 -- -- 88 -- -- -- 97    07/07/22 0256 -- -- 99 -- -- -- 100    07/06/22 2312 98.3 (36.8) Oral 95 16 130/82 -- 99            Facility-Administered Medications as of 7/7/2022   Medication Dose Route Frequency Provider Last Rate Last Admin   • acetaminophen (TYLENOL) tablet 650 mg  650 mg Oral Q4H PRN Girish Painting MD       • [START ON 7/8/2022] Enoxaparin Sodium (LOVENOX) syringe 40 mg  40 mg Subcutaneous  Daily Girish Painting MD       • [COMPLETED] haloperidol lactate (HALDOL) injection 5 mg  5 mg Intravenous Once Marianna Flowers APRN   5 mg at 07/07/22 0110   • mupirocin (BACTROBAN) 2 % ointment 1 application  1 application Topical Q12H Marianna Flowers APRN   1 application at 07/07/22 0134   • ondansetron (ZOFRAN) injection 4 mg  4 mg Intravenous Q6H PRN Girish Painting MD       • [COMPLETED] sodium chloride 0.9 % bolus 2,000 mL  2,000 mL Intravenous Once Marianna Flowers APRN   Stopped at 07/07/22 0300   • sodium chloride 0.9 % flush 10 mL  10 mL Intravenous PRN Marianna Flowers APRN       • sodium chloride 0.9 % infusion  150 mL/hr Intravenous Continuous Girish Painting  mL/hr at 07/07/22 0855 150 mL/hr at 07/07/22 0855     Orders (last 24 hrs)      Start     Ordered    07/08/22 0900  Enoxaparin Sodium (LOVENOX) syringe 40 mg  Daily         07/07/22 0745    07/08/22 0600  CBC & Differential  Daily       07/07/22 0745    07/08/22 0600  Basic Metabolic Panel  Daily       07/07/22 0745    07/08/22 0600  Magnesium  Daily       07/07/22 0745    07/08/22 0600  Phosphorus  Daily       07/07/22 0745    07/07/22 0940  Inpatient Case Management  Consult  Once        Provider:  (Not yet assigned)    07/07/22 0940    07/07/22 0903  DIET MESSAGE Plastic Utensils only, Close watch precautions  Once        Comments: Plastic Utensils only, Close watch precautions    07/07/22 0902    07/07/22 0902  Diet Regular  Diet Effective Now        Comments: Plastic Utensils only    07/07/22 0902    07/07/22 0800  Vital Signs  Every 4 Hours       07/07/22 0745    07/07/22 0800  Up in Chair  3 Times Daily        Comments: With meals as able    07/07/22 0745    07/07/22 0800  Oral Care  2 Times Daily       07/07/22 0745    07/07/22 0745  sodium chloride 0.9 % infusion  Continuous         07/07/22 0730    07/07/22 0744  Code Status and Medical Interventions:  Continuous         07/07/22 0745    07/07/22 0744  Diet Regular  Diet  Effective Now,   Status:  Canceled         07/07/22 0745    07/07/22 0744  Saline Lock & Maintain IV Access  Continuous         07/07/22 0745    07/07/22 0743  Notify Provider (With Default Parameters)  Until Discontinued         07/07/22 0745    07/07/22 0743  Activity - Ad Pippa  Until Discontinued         07/07/22 0745    07/07/22 0743  Intake & Output  Every Shift       07/07/22 0745    07/07/22 0743  Weigh Patient  Once         07/07/22 0745    07/07/22 0743  Oxygen Therapy- Nasal Cannula; Titrate for SPO2: 88% - 92%  Continuous         07/07/22 0745    07/07/22 0742  acetaminophen (TYLENOL) tablet 650 mg  Every 4 Hours PRN         07/07/22 0745    07/07/22 0742  ondansetron (ZOFRAN) injection 4 mg  Every 6 Hours PRN         07/07/22 0745    07/07/22 0742  Inpatient Psychiatrist Consult  Once        Specialty:  Psychiatry  Provider:  Mikal Alegre MD    07/07/22 0742    07/07/22 0741  Psych / Access Consult  Once        Provider:  Mikal Alegre MD    07/07/22 0740    07/07/22 0741  Inpatient Admission  Once         07/07/22 0742    07/07/22 0730  Hospitalist (on-call MD unless specified)  Once        Specialty:  Hospitalist  Provider:  Girish Painting MD    07/07/22 0730    07/07/22 0615  CK  STAT         07/07/22 0614    07/07/22 0100  haloperidol lactate (HALDOL) injection 5 mg  Once         07/07/22 0053    07/07/22 0017  XR Knee 1 or 2 View Right  1 Time Imaging         07/07/22 0002    07/07/22 0015  mupirocin (BACTROBAN) 2 % ointment 1 application  Every 12 Hours Scheduled         07/07/22 0002    07/07/22 0015  sodium chloride 0.9 % bolus 2,000 mL  Once         07/07/22 0008    07/07/22 0002  XR Knee 3 View Right  1 Time Imaging,   Status:  Canceled         07/07/22 0002    07/07/22 0002  Wound dressing  Once        Comments: Please clean abrasions to right knee and apply bactroban and dress with bandage.    07/07/22 0002    07/06/22 4838  Legal Status 72 Hour Hold  Continuous         07/06/22 9964     07/06/22 2319  Cardiac Monitoring  Continuous         07/06/22 2318 07/06/22 2319  Vital Signs  Per Hospital Policy         07/06/22 2318 07/06/22 2319  Continuous Pulse Oximetry  Continuous         07/06/22 2318 07/06/22 2319  POC Glucose Once  Once         07/06/22 2318 07/06/22 2319  Insert Peripheral IV  Once         07/06/22 2318    07/06/22 2319  [place]yer Draw  Once         07/06/22 2318 07/06/22 2319  CBC & Differential  Once         07/06/22 2318    07/06/22 2319  Comprehensive Metabolic Panel  Once         07/06/22 2318    07/06/22 2319  Acetaminophen Level  Once         07/06/22 2318    07/06/22 2319  Ethanol  Once         07/06/22 2318 07/06/22 2319  Salicylate Level  Once         07/06/22 2318 07/06/22 2319  Urine Drug Screen - Urine, Clean Catch  Once         07/06/22 2318 07/06/22 2319  CK  STAT         07/06/22 2318 07/06/22 2319  Green Top (Gel)  PROCEDURE ONCE         07/06/22 2318    07/06/22 2319  Lavender Top  PROCEDURE ONCE         07/06/22 2318    07/06/22 2319  Gold Top - SST  PROCEDURE ONCE         07/06/22 2318 07/06/22 2319  Light Blue Top  PROCEDURE ONCE         07/06/22 2318 07/06/22 2319  CBC Auto Differential  PROCEDURE ONCE         07/06/22 2318 07/06/22 2319  Urinalysis With Microscopic If Indicated (No Culture) - Urine, Clean Catch  Once         07/06/22 2318 07/06/22 2318  Oxygen Therapy- Nasal Cannula; Titrate for SPO2: equal to or greater than, 92%  Continuous PRN,   Status:  Canceled       07/06/22 2318 07/06/22 2318  sodium chloride 0.9 % flush 10 mL  As Needed         07/06/22 2318                Physician Progress Notes (last 24 hours)  Notes from 07/06/22 0956 through 07/07/22 0956   No notes of this type exist for this encounter.         Consult Notes (last 24 hours)  Notes from 07/06/22 0956 through 07/07/22 0956   No notes of this type exist for this encounter.

## 2022-07-07 NOTE — PLAN OF CARE
Problem: Adult Inpatient Plan of Care  Goal: Plan of Care Review  Outcome: Ongoing, Progressing  Flowsheets (Taken 7/7/2022 1535)  Outcome Evaluation: Will need discharge planning, fluids running. Alert and oriented but withdrawn, tearful at times  Goal: Patient-Specific Goal (Individualized)  Outcome: Ongoing, Progressing  Goal: Absence of Hospital-Acquired Illness or Injury  Outcome: Ongoing, Progressing  Intervention: Identify and Manage Fall Risk  Recent Flowsheet Documentation  Taken 7/7/2022 1019 by Brooke Ivan RN  Safety Promotion/Fall Prevention: safety round/check completed  Taken 7/7/2022 0857 by Brooke Ivan RN  Safety Promotion/Fall Prevention: safety round/check completed  Intervention: Prevent Skin Injury  Recent Flowsheet Documentation  Taken 7/7/2022 0939 by Brooke Ivan RN  Body Position: position changed independently  Goal: Optimal Comfort and Wellbeing  Outcome: Ongoing, Progressing  Goal: Readiness for Transition of Care  Outcome: Ongoing, Progressing  Intervention: Mutually Develop Transition Plan  Recent Flowsheet Documentation  Taken 7/7/2022 0955 by Brooke Ivan RN  Transportation Anticipated: (unknown) other (see comments)  Transportation Concerns: other (see comments)  Patient/Family Anticipated Services at Transition:   Patient/Family Anticipates Transition to: (unknown) other (see comments)  Taken 7/7/2022 0939 by Brooke Ivan, RN  Equipment Currently Used at Home: none     Problem: Skin Injury Risk Increased  Goal: Skin Health and Integrity  Outcome: Ongoing, Progressing   Goal Outcome Evaluation:              Outcome Evaluation: Will need discharge planning, fluids running. Alert and oriented but withdrawn, tearful at times

## 2022-07-07 NOTE — ED PROVIDER NOTES
Time: 07:41 EDT  Arrived by: walked in  Chief Complaint: psych eval  History provided by: patient  History is limited by: N/A    History of Present Illness:  Patient is a 41 y.o. year old male that presents to the emergency department with     This is a 41-year-old male patient who presents today to the emergency department for psych evaluation and medical clearance.  He is somewhat difficult to understand when communicating due to his current state and probable intoxication.  He is rambling when he speaks and is not making sense with his conversation.  During the conversation he did admit that he is homeless and living on the street and that today he tried to jump the fence at [place] [facility].  He made a comment that there was some sort of Rastafarian ritual that involved some sort of sacrifice but unable to describe the ritual he was speaking of.  He was able to tell me that he does currently use meth and typically will smoke it.  He states that the last use was today.  He is tearful when we are talking and says that he is scared but he is unable to explain why he is scared.  He denies any suicidal or homicidal ideations.    He does complain of right knee pain and bilateral hand pain where he fell trying to climb the post today at [facility].  He complains of abrasions and bleeding to the right knee as well as to his hands.  He denies any neck or back pain.      History provided by:  Patient  History limited by:  Psychiatric disorder   used: No    Psychiatric Evaluation  Severity:  Unable to specify  Onset quality:  Unable to specify  Chronicity:  New  Associated symptoms: fatigue    Associated symptoms: no abdominal pain, no chest pain, no cough, no fever, no nausea, no shortness of breath and no vomiting    Dehydration  Associated symptoms: fatigue    Associated symptoms: no abdominal pain, no chest pain, no cough, no fever, no nausea, no shortness of breath and no vomiting            Similar Symptoms  "Previously: No  Recently seen: No      Patient Care Team  Primary Care Provider: Unknown    Past Medical History:     No Known Allergies  No past medical history on file.  No past surgical history on file.  No family history on file.    Home Medications:  Prior to Admission medications    Not on File        Social History:   PT  has no history on file for tobacco use, alcohol use, and drug use.    Record Review:  I have reviewed the patient's records in [facility].     Review of Systems  Review of Systems   Constitutional: Positive for fatigue. Negative for appetite change and fever.   HENT: Negative.    Eyes: Negative.    Respiratory: Negative.  Negative for cough and shortness of breath.    Cardiovascular: Negative.  Negative for chest pain.   Gastrointestinal: Negative for abdominal pain, nausea and vomiting.   Endocrine: Negative.    Genitourinary: Negative.    Musculoskeletal:        Right knee pain   Allergic/Immunologic: Negative.    Neurological: Negative.    Psychiatric/Behavioral: Positive for agitation, behavioral problems, confusion and hallucinations. The patient is nervous/anxious.         Racing thoughts, hallucinations that someone is trying to kill him        Physical Exam  /82   Pulse 82   Temp 98.3 °F (36.8 °C) (Oral)   Resp 16   Ht 185.4 cm (73\")   Wt 90.4 kg (199 lb 4.7 oz)   SpO2 94%   BMI 26.29 kg/m²     Physical Exam  Vitals and nursing note reviewed.   Constitutional:       General: He is not in acute distress.     Appearance: Normal appearance. He is not toxic-appearing.   HENT:      Head: Normocephalic and atraumatic.      Nose: Nose normal.      Mouth/Throat:      Mouth: Mucous membranes are moist.   Eyes:      Extraocular Movements: Extraocular movements intact.      Pupils: Pupils are equal, round, and reactive to light.   Cardiovascular:      Rate and Rhythm: Normal rate and regular rhythm.      Pulses: Normal pulses.      Heart sounds: Normal heart sounds.   Pulmonary:      " "Effort: Pulmonary effort is normal.      Breath sounds: Normal breath sounds.   Abdominal:      General: Bowel sounds are normal.      Palpations: Abdomen is soft.      Tenderness: There is no abdominal tenderness.   Musculoskeletal:         General: Tenderness (Right knee tenderness.) present. No swelling or deformity. Normal range of motion.      Cervical back: Normal range of motion.   Skin:     General: Skin is warm and dry.      Findings: Lesion present.      Comments: Multiple abrasions to the right knee noted with bleeding.      Abrasions to the palmar surfaces of bilateral hands.  Bleeding controlled.   Neurological:      General: No focal deficit present.      Mental Status: He is alert and oriented to person, place, and time.   Psychiatric:      Comments: Patient is paranoid.  He is anxious and restless.  His conversation is not completely making sense.  Racing thoughts having hallucinations that someone is chasing him trying to kill him.          ED Course  /82   Pulse 82   Temp 98.3 °F (36.8 °C) (Oral)   Resp 16   Ht 185.4 cm (73\")   Wt 90.4 kg (199 lb 4.7 oz)   SpO2 94%   BMI 26.29 kg/m²   Results for orders placed or performed during the hospital encounter of 07/06/22   Comprehensive Metabolic Panel    Specimen: Blood   Result Value Ref Range    Glucose 107 (H) 65 - 99 mg/dL    BUN 18 6 - 20 mg/dL    Creatinine 1.28 (H) 0.76 - 1.27 mg/dL    Sodium 143 136 - 145 mmol/L    Potassium 3.8 3.5 - 5.2 mmol/L    Chloride 106 98 - 107 mmol/L    CO2 23.0 22.0 - 29.0 mmol/L    Calcium 9.6 8.6 - 10.5 mg/dL    Total Protein 7.2 6.0 - 8.5 g/dL    Albumin 4.50 3.50 - 5.20 g/dL    ALT (SGPT) 27 1 - 41 U/L    AST (SGOT) 44 (H) 1 - 40 U/L    Alkaline Phosphatase 72 39 - 117 U/L    Total Bilirubin 0.7 0.0 - 1.2 mg/dL    Globulin 2.7 gm/dL    A/G Ratio 1.7 g/dL    BUN/Creatinine Ratio 14.1 7.0 - 25.0    Anion Gap 14.0 5.0 - 15.0 mmol/L    eGFR 71.2 >60.0 mL/min/1.73   Acetaminophen Level    Specimen: Blood "   Result Value Ref Range    Acetaminophen <5.0 0.0 - 30.0 mcg/mL   Ethanol    Specimen: Blood   Result Value Ref Range    Ethanol <10 0 - 10 mg/dL    Ethanol % <0.010 %   Salicylate Level    Specimen: Blood   Result Value Ref Range    Salicylate <0.3 <=30.0 mg/dL   Urine Drug Screen - Urine, Clean Catch    Specimen: Urine, Clean Catch   Result Value Ref Range    Amphet/Methamphet, Screen Positive (A) Negative    Barbiturates Screen, Urine Negative Negative    Benzodiazepine Screen, Urine Negative Negative    Cocaine Screen, Urine Negative Negative    Opiate Screen Negative Negative    THC, Screen, Urine Negative Negative    Methadone Screen, Urine Negative Negative    Oxycodone Screen, Urine Negative Negative   CK    Specimen: Blood   Result Value Ref Range    Creatine Kinase 1,782 (H) 20 - 200 U/L   CBC Auto Differential    Specimen: Blood   Result Value Ref Range    WBC 9.87 3.40 - 10.80 10*3/mm3    RBC 4.27 4.14 - 5.80 10*6/mm3    Hemoglobin 12.3 (L) 13.0 - 17.7 g/dL    Hematocrit 36.1 (L) 37.5 - 51.0 %    MCV 84.5 79.0 - 97.0 fL    MCH 28.8 26.6 - 33.0 pg    MCHC 34.1 31.5 - 35.7 g/dL    RDW 13.4 12.3 - 15.4 %    RDW-SD 41.4 37.0 - 54.0 fl    MPV 10.6 6.0 - 12.0 fL    Platelets 286 140 - 450 10*3/mm3    Neutrophil % 79.9 (H) 42.7 - 76.0 %    Lymphocyte % 10.2 (L) 19.6 - 45.3 %    Monocyte % 8.5 5.0 - 12.0 %    Eosinophil % 0.5 0.3 - 6.2 %    Basophil % 0.6 0.0 - 1.5 %    Immature Grans % 0.3 0.0 - 0.5 %    Neutrophils, Absolute 7.88 (H) 1.70 - 7.00 10*3/mm3    Lymphocytes, Absolute 1.01 0.70 - 3.10 10*3/mm3    Monocytes, Absolute 0.84 0.10 - 0.90 10*3/mm3    Eosinophils, Absolute 0.05 0.00 - 0.40 10*3/mm3    Basophils, Absolute 0.06 0.00 - 0.20 10*3/mm3    Immature Grans, Absolute 0.03 0.00 - 0.05 10*3/mm3    nRBC 0.0 0.0 - 0.2 /100 WBC   Urinalysis With Microscopic If Indicated (No Culture) - Urine, Clean Catch    Specimen: Urine, Clean Catch   Result Value Ref Range    Color, UA Yellow Yellow, Straw     Appearance, UA Clear Clear    pH, UA 6.0 5.0 - 8.0    Specific Gravity, UA >=1.030 1.005 - 1.030    Glucose, UA Negative Negative    Ketones, UA 15 mg/dL (1+) (A) Negative    Bilirubin, UA Negative Negative    Blood, UA Negative Negative    Protein, UA Trace (A) Negative    Leuk Esterase, UA Negative Negative    Nitrite, UA Negative Negative    Urobilinogen, UA 2.0 E.U./dL (A) 0.2 - 1.0 E.U./dL   CK    Specimen: Arm, Left; Blood   Result Value Ref Range    Creatine Kinase 3,362 (H) 20 - 200 U/L   Green Top (Gel)   Result Value Ref Range    Extra Tube Hold for add-ons.    Lavender Top   Result Value Ref Range    Extra Tube hold for add-on    Gold Top - SST   Result Value Ref Range    Extra Tube Hold for add-ons.    Light Blue Top   Result Value Ref Range    Extra Tube Hold for add-ons.      Medications   sodium chloride 0.9 % flush 10 mL (has no administration in time range)   mupirocin (BACTROBAN) 2 % ointment 1 application (1 application Topical Given 7/7/22 0134)   sodium chloride 0.9 % infusion (has no administration in time range)   sodium chloride 0.9 % bolus 2,000 mL (0 mL Intravenous Stopped 7/7/22 0300)   haloperidol lactate (HALDOL) injection 5 mg (5 mg Intravenous Given 7/7/22 0110)     XR Knee 1 or 2 View Right    Result Date: 7/7/2022  Narrative: PROCEDURE: XR KNEE 1 OR 2 VW RIGHT  (SINGLE VIEW)  COMPARISON: None.  INDICATIONS: FALL TODAY RIGHT KNEE PAIN.  FINDINGS:  A single nonweightbearing AP view of the right knee is provided for review.  No acute fracture or acute malalignment is identified on this single view.  The patient was uncooperative for the study per the performing x-ray technologist.  Therefore, a complete exam was unable to be performed.  If symptoms or clinical concerns persist, consider imaging follow-up.      Impression:   No acute fracture or acute malalignment is identified on this single AP view of the right knee.  Please see above comments for further detail.     COMMENT:  Part of  this note is an electronic transcription of spoken language to printed text. The electronic translation/transcription may permit erroneous, or at times, nonsensical (or even sensical) words or phrases to be inadvertently transcribed or omitted; this  has reviewed the note for such errors (as well as additional errors); however, some may still exist.  LORI ZHANG JR, MD       Electronically Signed and Approved By: LORI ZHANG JR, MD on 7/07/2022 at 2:36                Medical Decision Making:                     MDM  Number of Diagnoses or Management Options  Amphetamine abuse (HCC)  Non-traumatic rhabdomyolysis  Psychosis, unspecified psychosis type (HCC)  Diagnosis management comments: Patient with psychosis likely drug induced will be admitted for safety and stabilization since found to have rhabdomyolysis       Amount and/or Complexity of Data Reviewed  Clinical lab tests: reviewed and ordered  Tests in the radiology section of CPT®: reviewed and ordered  Tests in the medicine section of CPT®: ordered and reviewed    Risk of Complications, Morbidity, and/or Mortality  Presenting problems: moderate  Diagnostic procedures: moderate  Management options: low    Patient Progress  Patient progress: stable       Final diagnoses:   Amphetamine abuse (HCC)   Non-traumatic rhabdomyolysis   Psychosis, unspecified psychosis type (HCC)        Disposition:  ED Disposition     ED Disposition   Decision to Admit    Condition   --    Comment   --              Marianna Flowers, DIXON  07/07/22 0741

## 2022-07-07 NOTE — CONSULTS
[facility]   PSYCHIATRIC CONSULTATION    Patient Name: Willow Khan  : 1980  MRN: [id number]  Primary Care Physician:  Provider, No Known  Date of admission: 2022    Referring Provider: Dr. Painting  Reason for Consultation: Psychosis    Subjective   Subjective     Chief Complaint: Patient sleeping unable to be aroused    HPI:     Willow Khan is a 41 y.o. male brought into the hospital for bizarre behavior and medical clearance.  Patient is rambling and disorganized in his thoughts and emergency room.  Patient has sleeping does not arouse for interview today.  He was given haloperidol emergency room for acute agitation.  Toxicology screen was positive for amphetamines and suspect a substance-induced psychosis.      No records in epic reveal any previous psychiatric treatment    He was noted to be quite bizarre with delusional paranoid thinking.  Attempt to climb a fence at [place].  He was disorganized and tangential.  It is noted to be homeless.  Does not have a labile affect    Admitted medically for rhabdomyolysis    Review of Systems   All systems were reviewed and negative except for: Patient sleeping does not respond    Personal History     No past medical history on file.    No past surgical history on file.    Past Psychiatric History: Unable to obtain from patient at this time    Psychiatric Hospitalizations: Unable to obtain from patient    Suicide Attempts: Unable to obtain    Prior Treatment and Medications Tried: Unable to obtain        Family History: family history is not on file. Otherwise pertinent FHx was reviewed and not pertinent to current issue.    Social History:  Unable to obtain from patient    Substance Abuse History: Lexicology screen positive for amphetamines    Home Medications: No home medication         Allergies:  No Known Allergies    Objective   Objective     Vitals:   Temp:  [97.8 °F (36.6 °C)-99.1 °F (37.3 °C)] 99.1 °F (37.3 °C)  Heart Rate:  [73-99]  83  Resp:  [16-18] 18  BP: (107-130)/(53-88) 107/53  Physical Exam       Mental Status Exam:     Patient is sleeping and unable to obtain    Result Review    Result Review:  I have personally reviewed the results from the time of this admission to 7/7/2022 12:35 EDT and agree with these findings:  [x]  Laboratory  []  Microbiology  []  Radiology  []  EKG/Telemetry   []  Cardiology/Vascular   []  Pathology  []  Old records  []  Other:  Most notable findings include: Toxicology screen positive amphetamines    Assessment & Plan   Assessment / Plan     Brief Patient Summary:  Willow Khna is a 41 y.o. male who admitted for rhabdomyolysis so to be psychotic using methamphetamine.    Active Hospital Problems:  Active Hospital Problems    Diagnosis    • Rhabdomyolysis        Plan:   1) we will initiate Risperdal for underlying psychosis  2) as needed medications for acute agitation  3) we will follow make treatment recommendations as indicated    Electronically signed by Mikal Alegre MD, 07/07/22, 12:35 PM EDT.

## 2022-07-08 LAB
ANION GAP SERPL CALCULATED.3IONS-SCNC: 8.9 MMOL/L (ref 5–15)
BASOPHILS # BLD AUTO: 0.04 10*3/MM3 (ref 0–0.2)
BASOPHILS NFR BLD AUTO: 0.9 % (ref 0–1.5)
BUN SERPL-MCNC: 9 MG/DL (ref 6–20)
BUN/CREAT SERPL: 11.3 (ref 7–25)
CALCIUM SPEC-SCNC: 8.3 MG/DL (ref 8.6–10.5)
CHLORIDE SERPL-SCNC: 111 MMOL/L (ref 98–107)
CK SERPL-CCNC: 1744 U/L (ref 20–200)
CO2 SERPL-SCNC: 21.1 MMOL/L (ref 22–29)
CREAT SERPL-MCNC: 0.8 MG/DL (ref 0.76–1.27)
DEPRECATED RDW RBC AUTO: 43.4 FL (ref 37–54)
EGFRCR SERPLBLD CKD-EPI 2021: 112.6 ML/MIN/1.73
EOSINOPHIL # BLD AUTO: 0.21 10*3/MM3 (ref 0–0.4)
EOSINOPHIL NFR BLD AUTO: 4.5 % (ref 0.3–6.2)
ERYTHROCYTE [DISTWIDTH] IN BLOOD BY AUTOMATED COUNT: 13.8 % (ref 12.3–15.4)
GLUCOSE SERPL-MCNC: 104 MG/DL (ref 65–99)
HCT VFR BLD AUTO: 34.1 % (ref 37.5–51)
HGB BLD-MCNC: 11.3 G/DL (ref 13–17.7)
IMM GRANULOCYTES # BLD AUTO: 0.01 10*3/MM3 (ref 0–0.05)
IMM GRANULOCYTES NFR BLD AUTO: 0.2 % (ref 0–0.5)
LYMPHOCYTES # BLD AUTO: 1.55 10*3/MM3 (ref 0.7–3.1)
LYMPHOCYTES NFR BLD AUTO: 33.4 % (ref 19.6–45.3)
MAGNESIUM SERPL-MCNC: 2 MG/DL (ref 1.6–2.6)
MCH RBC QN AUTO: 28.8 PG (ref 26.6–33)
MCHC RBC AUTO-ENTMCNC: 33.1 G/DL (ref 31.5–35.7)
MCV RBC AUTO: 86.8 FL (ref 79–97)
MONOCYTES # BLD AUTO: 0.47 10*3/MM3 (ref 0.1–0.9)
MONOCYTES NFR BLD AUTO: 10.1 % (ref 5–12)
NEUTROPHILS NFR BLD AUTO: 2.36 10*3/MM3 (ref 1.7–7)
NEUTROPHILS NFR BLD AUTO: 50.9 % (ref 42.7–76)
NRBC BLD AUTO-RTO: 0 /100 WBC (ref 0–0.2)
PHOSPHATE SERPL-MCNC: 3.3 MG/DL (ref 2.5–4.5)
PLATELET # BLD AUTO: 260 10*3/MM3 (ref 140–450)
PMV BLD AUTO: 10.7 FL (ref 6–12)
POTASSIUM SERPL-SCNC: 3.6 MMOL/L (ref 3.5–5.2)
RBC # BLD AUTO: 3.93 10*6/MM3 (ref 4.14–5.8)
SODIUM SERPL-SCNC: 141 MMOL/L (ref 136–145)
WBC NRBC COR # BLD: 4.64 10*3/MM3 (ref 3.4–10.8)

## 2022-07-08 PROCEDURE — 84100 ASSAY OF PHOSPHORUS: CPT | Performed by: INTERNAL MEDICINE

## 2022-07-08 PROCEDURE — 82550 ASSAY OF CK (CPK): CPT | Performed by: INTERNAL MEDICINE

## 2022-07-08 PROCEDURE — 25010000002 ENOXAPARIN PER 10 MG: Performed by: INTERNAL MEDICINE

## 2022-07-08 PROCEDURE — 85025 COMPLETE CBC W/AUTO DIFF WBC: CPT | Performed by: INTERNAL MEDICINE

## 2022-07-08 PROCEDURE — 97161 PT EVAL LOW COMPLEX 20 MIN: CPT

## 2022-07-08 PROCEDURE — 36415 COLL VENOUS BLD VENIPUNCTURE: CPT | Performed by: INTERNAL MEDICINE

## 2022-07-08 PROCEDURE — 97165 OT EVAL LOW COMPLEX 30 MIN: CPT

## 2022-07-08 PROCEDURE — 99232 SBSQ HOSP IP/OBS MODERATE 35: CPT | Performed by: INTERNAL MEDICINE

## 2022-07-08 PROCEDURE — 83735 ASSAY OF MAGNESIUM: CPT | Performed by: INTERNAL MEDICINE

## 2022-07-08 PROCEDURE — 80048 BASIC METABOLIC PNL TOTAL CA: CPT | Performed by: INTERNAL MEDICINE

## 2022-07-08 RX ADMIN — MUPIROCIN 1 APPLICATION: 20 OINTMENT TOPICAL at 09:26

## 2022-07-08 RX ADMIN — MUPIROCIN 1 APPLICATION: 20 OINTMENT TOPICAL at 22:48

## 2022-07-08 RX ADMIN — ENOXAPARIN SODIUM 40 MG: 100 INJECTION SUBCUTANEOUS at 09:26

## 2022-07-08 RX ADMIN — RISPERIDONE 3 MG: 3 TABLET ORAL at 22:47

## 2022-07-08 RX ADMIN — SODIUM CHLORIDE 150 ML/HR: 9 INJECTION, SOLUTION INTRAVENOUS at 03:06

## 2022-07-08 NOTE — NURSING NOTE
Note to be communicated with social work.  Patient's sister, Roxy, came in and wanted me to communicate patients history with the .   Sister stated at the end of April patient was admitted to [facility] for 10 days then [facility] to [facility]. Got covid and was released home with his mom to quarantine. Was arrested while quarantineing and was released 3-4 days later and returned to [facility]. Left [facility] without completing the program and lived homeless until beginning of June. He was arrested then and did 30 days. Got out June 28th, lived homeless until admitted here.   Patients mother does not want him to come home with her. Mother wants him to return to rehab and patient has agreed at this time.   Mother wants  to contact his sister, Roxy, tomorrow to make arrangements for him.

## 2022-07-08 NOTE — PLAN OF CARE
Goal Outcome Evaluation:      Close watch remains at bedside.NS continues to run @ 150 ml/hr. VSS. No complaints.

## 2022-07-08 NOTE — THERAPY EVALUATION
Acute Care - Physical Therapy Initial Evaluation   [facility]     Patient Name: Willow Khan  : 1980  MRN: [id number]  Today's Date: 2022      Visit Dx:     ICD-10-CM ICD-9-CM   1. Amphetamine abuse (HCC)  F15.10 305.70   2. Non-traumatic rhabdomyolysis  M62.82 728.88   3. Psychosis, unspecified psychosis type (HCC)  F29 298.9   4. Decreased activities of daily living (ADL)  Z78.9 V49.89   5. Difficulty walking  R26.2 719.7     Patient Active Problem List   Diagnosis   • Rhabdomyolysis     No past medical history on file.  No past surgical history on file.  PT Assessment (last 12 hours)     PT Evaluation and Treatment     Row Name 22 1500          Physical Therapy Time and Intention    Subjective Information no complaints  -DP     Document Type evaluation  -DP     Mode of Treatment individual therapy;physical therapy  -DP     Patient Effort adequate  -DP     Row Name 22 1500          General Information    Patient Profile Reviewed yes  -DP     Patient Observations alert;cooperative  -DP     Prior Level of Function independent:;gait;transfer;ADL's;bed mobility  -DP     Equipment Currently Used at Home none  -DP     Existing Precautions/Restrictions fall  -DP     Row Name 22 1500          Living Environment    Current Living Arrangements home  Pt is currently homeless  -DP     Row Name 22 1500          Range of Motion (ROM)    Range of Motion bilateral lower extremities;ROM is WFL  -DP     Row Name 22 1500          Strength (Manual Muscle Testing)    Strength (Manual Muscle Testing) bilateral lower extremities  4/5  -DP     Row Name 22 1500          Bed Mobility    Bed Mobility supine-sit-supine  -DP     Supine-Sit-Supine [place] (Bed Mobility) standby assist  -DP     Row Name 22 1500          Transfers    Transfers sit-stand transfer  -DP     Sit-Stand [place] (Transfers) minimum assist (75% patient effort)  -DP     Row Name 22 1500           Sit-Stand Transfer    Comment, (Sit-Stand Transfer) Patient was able to complete sit to stand transfer with without an assistive device with minimal assistance and with the use of a rolling walker with contact-guard assistance.  -DP     Row Name 07/08/22 1500          Gait/Stairs (Locomotion)    Gait/Stairs Locomotion gait/ambulation assistive device  -DP     [place] Level (Gait) contact guard  -DP     Assistive Device (Gait) walker, front-wheeled  -DP     Distance in Feet (Gait) 15  -DP     Row Name 07/08/22 1500          Balance    Dynamic Standing Balance contact guard  -DP     Position/Device Used, Standing Balance walker, front-wheeled  -DP     Row Name 07/08/22 1500          Plan of Care Review    Plan of Care Reviewed With patient  -DP     Outcome Evaluation Patient presents with decreased strength, balance, and functional mobility.  He will benefit from skilled PT services to address these deficits.  -DP     Row Name 07/08/22 1500          Therapy Assessment/Plan (PT)    Rehab Potential (PT) good, to achieve stated therapy goals  -DP     Criteria for Skilled Interventions Met (PT) yes;meets criteria  -DP     Therapy Frequency (PT) daily  -DP     Predicted Duration of Therapy Intervention (PT) 10 days  -DP     Problem List (PT) problems related to;strength;mobility  -DP     Activity Limitations Related to Problem List (PT) unable to ambulate safely;unable to transfer safely  -DP     Row Name 07/08/22 1500          PT Evaluation Complexity    History, PT Evaluation Complexity no personal factors and/or comorbidities  -DP     Examination of Body Systems (PT Eval Complexity) total of 4 or more elements  -DP     Clinical Presentation (PT Evaluation Complexity) stable  -DP     Clinical Decision Making (PT Evaluation Complexity) low complexity  -DP     Overall Complexity (PT Evaluation Complexity) low complexity  -DP     Row Name 07/08/22 1500          Physical Therapy Goals    Transfer Goal Selection (PT)  transfer, PT goal 1  -DP     Gait Training Goal Selection (PT) gait training, PT goal 1  -DP     Row Name 07/08/22 1500          Transfer Goal 1 (PT)    Activity/Assistive Device (Transfer Goal 1, PT) sit-to-stand/stand-to-sit  -DP     [place] Level/Cues Needed (Transfer Goal 1, PT) supervision required  -DP     Time Frame (Transfer Goal 1, PT) 10 days  -DP     Row Name 07/08/22 1500          Gait Training Goal 1 (PT)    Activity/Assistive Device (Gait Training Goal 1, PT) assistive device use  -DP     [place] Level (Gait Training Goal 1, PT) standby assist  -DP     Distance (Gait Training Goal 1, PT) 200  -DP     Time Frame (Gait Training Goal 1, PT) 10 days  -DP           User Key  (r) = Recorded By, (t) = Taken By, (c) = Cosigned By    Initials Name Provider Type    DP Mahendra Akbar, PT Physical Therapist                  PT Recommendation and Plan  Anticipated Discharge Disposition (PT): sub acute care setting, inpatient rehabilitation facility  Planned Therapy Interventions (PT): balance training, bed mobility training, gait training, strengthening, transfer training  Therapy Frequency (PT): daily  Plan of Care Reviewed With: patient  Outcome Evaluation: Patient presents with decreased strength, balance, and functional mobility.  He will benefit from skilled PT services to address these deficits.   Outcome Measures     Row Name 07/08/22 1526             How much help from another person do you currently need...    Turning from your back to your side while in flat bed without using bedrails? 4  -DP      Moving from lying on back to sitting on the side of a flat bed without bedrails? 4  -DP      Moving to and from a bed to a chair (including a wheelchair)? 3  -DP      Standing up from a chair using your arms (e.g., wheelchair, bedside chair)? 3  -DP      Climbing 3-5 steps with a railing? 3  -DP      To walk in hospital room? 3  -DP      AM-PAC 6 Clicks Score (PT) 20  -DP              Functional  Assessment    Outcome Measure Options AM-PAC 6 Clicks Basic Mobility (PT)  -DP            User Key  (r) = Recorded By, (t) = Taken By, (c) = Cosigned By    Initials Name Provider Type    Mahendra Walker PT Physical Therapist                 Time Calculation:    PT Charges     Row Name 07/08/22 1528             Time Calculation    PT Received On 07/08/22  -DP      PT Goal Re-Cert Due Date 07/17/22  -DP              Untimed Charges    PT Eval/Re-eval Minutes 40  -DP              Total Minutes    Untimed Charges Total Minutes 40  -DP       Total Minutes 40  -DP            User Key  (r) = Recorded By, (t) = Taken By, (c) = Cosigned By    Initials Name Provider Type    Mahendra Walker PT Physical Therapist              Therapy Charges for Today     Code Description Service Date Service Provider Modifiers Qty    [id number] HC PT EVAL LOW COMPLEXITY 3 7/8/2022 Mahendra Akbar PT GP 1          PT G-Codes  Outcome Measure Options: AM-PAC 6 Clicks Basic Mobility (PT)  AM-PAC 6 Clicks Score (PT): 20  AM-PAC 6 Clicks Score (OT): 24    Mahendra Akbar PT  7/8/2022

## 2022-07-08 NOTE — THERAPY EVALUATION
Patient Name: Willow Khan  : 1980    MRN: [id number]                              Today's Date: 2022       Admit Date: 2022    Visit Dx:     ICD-10-CM ICD-9-CM   1. Amphetamine abuse (HCC)  F15.10 305.70   2. Non-traumatic rhabdomyolysis  M62.82 728.88   3. Psychosis, unspecified psychosis type (HCC)  F29 298.9   4. Decreased activities of daily living (ADL)  Z78.9 V49.89     Patient Active Problem List   Diagnosis   • Rhabdomyolysis     No past medical history on file.  No past surgical history on file.   General Information     Row Name 22 1452          OT Time and Intention    Document Type evaluation  -ES     Mode of Treatment individual therapy;occupational therapy  -ES     Row Name 22 145          General Information    Patient Profile Reviewed yes  -ES     Prior Level of Function independent:  Patient independent with B and IADLs at baseline. No device for mobility. Patient is homeless. Unemployed. No home O2.  -ES     Existing Precautions/Restrictions no known precautions/restrictions  -ES     Barriers to Rehab none identified  -ES     Row Name 22 1452          Occupational Profile    Reason for Services/Referral (Occupational Profile) Patient is 41 yr old female admitted to [facility] on 2022 with reports of AMS and paranoid behavior. Patient found attempting to climb fence at [place]. Tox screen positive for methamphetamine. Patient PMH significant for substance abuse. OT evaluation and treatment ordered d/t recent decline in ADLs/transfer ability. No previous OT services for current condition.  -ES     Row Name 22 1452          Living Environment    People in Home alone  patient is homeless  -ES     Row Name 22 1452          Cognition    Orientation Status (Cognition) oriented x 4  patient pleasant and cooperative, agreeable to therapy evaluation  -ES           User Key  (r) = Recorded By, (t) = Taken By, (c) = Cosigned By    Initials  Name Provider Type    ES Marsha Dudley OT Occupational Therapist                 Mobility/ADL's     Row Name 07/08/22 1455          Bed Mobility    Bed Mobility supine-sit;sit-supine  -ES     Supine-Sit [place] (Bed Mobility) independent  -ES     Sit-Supine [place] (Bed Mobility) independent  -ES     Row Name 07/08/22 1455          Transfers    Transfers sit-stand transfer  -ES     Sit-Stand [place] (Transfers) supervision  -ES     Row Name 07/08/22 1455          Sit-Stand Transfer    Assistive Device (Sit-Stand Transfers) other (see comments)  no assistive device  -ES     Row Name 07/08/22 1455          Functional Mobility    Functional Mobility- Ind. Level supervision required  -ES     Functional Mobility- Device other (see comments)  no assistive device  -ES     Functional Mobility- Comment to/from bathroom  -ES     Row Name 07/08/22 1455          Activities of Daily Living    BADL Assessment/Intervention bathing;upper body dressing;lower body dressing;grooming;feeding;toileting  -ES     Row Name 07/08/22 1455          Bathing Assessment/Intervention    [place] Level (Bathing) bathing skills;set up;independent  -ES     Row Name 07/08/22 1455          Upper Body Dressing Assessment/Training    [place] Level (Upper Body Dressing) upper body dressing skills;independent  -ES     Row Name 07/08/22 1455          Lower Body Dressing Assessment/Training    [place] Level (Lower Body Dressing) lower body dressing skills;independent  -ES     Row Name 07/08/22 1455          Grooming Assessment/Training    [place] Level (Grooming) grooming skills;independent  -ES     Row Name 07/08/22 1455          Self-Feeding Assessment/Training    [place] Level (Feeding) feeding skills;independent  -ES     Row Name 07/08/22 1455          Toileting Assessment/Training    [place] Level (Toileting) toileting skills;independent  -ES           User Key  (r) = Recorded By, (t) = Taken By, (c) =  Cosigned By    Initials Name Provider Type    ES Marsha Dudley OT Occupational Therapist               Obj/Interventions     Row Name 07/08/22 1459          Sensory Assessment (Somatosensory)    Sensory Assessment (Somatosensory) bilateral UE  -ES     Bilateral UE Sensory Assessment general sensation;intact  -ES     Row Name 07/08/22 1459          Vision Assessment/Intervention    Visual Impairment/Limitations WFL  -ES     Row Name 07/08/22 1459          Range of Motion Comprehensive    General Range of Motion no range of motion deficits identified  -ES     Row Name 07/08/22 1459          Strength Comprehensive (MMT)    General Manual Muscle Testing (MMT) Assessment no strength deficits identified  -ES     Comment, General Manual Muscle Testing (MMT) Assessment bilateral upper extremities assessed 5/5  -ES     Row Name 07/08/22 1459          Motor Skills    Motor Skills coordination;functional endurance  -ES     Coordination WFL;bilateral;upper extremity  -ES     Functional Endurance good with transfers, mobility, and ADL engagement  -ES     Row Name 07/08/22 1459          Balance    Balance Assessment sitting dynamic balance;standing dynamic balance  -ES     Dynamic Sitting Balance independent  -ES     Dynamic Standing Balance supervision  -ES     Position/Device Used, Standing Balance unsupported  -ES           User Key  (r) = Recorded By, (t) = Taken By, (c) = Cosigned By    Initials Name Provider Type    Marsha Phillip OT Occupational Therapist               Goals/Plan    No documentation.                Clinical Impression     Row Name 07/08/22 1459          Plan of Care Review    Plan of Care Reviewed With patient  -ES     Progress no change  initial evaluation encounter  -ES     Outcome Evaluation Patient presents at or near baseline functional status with no deficits related to strength, balance, tolerance, safety awareness, transfers or mobility that impede patient independence with activities of daily  living. No indicated need for skilled OT intervention in the acute care setting. OT will sign off at this time.  -ES     Row Name 07/08/22 1459          Therapy Assessment/Plan (OT)    Criteria for Skilled Therapeutic Interventions Met (OT) no problems identified which require skilled intervention  -ES     Therapy Frequency (OT) evaluation only  -ES     Row Name 07/08/22 1459          Therapy Plan Review/Discharge Plan (OT)    Anticipated Discharge Disposition (OT) other (see comments)  patient interested in IP drug rehabilitation progream at discharge from [facility]  -ES     Row Name 07/08/22 1459          Vital Signs    O2 Delivery Pre Treatment room air  -ES     O2 Delivery Intra Treatment room air  -ES     O2 Delivery Post Treatment room air  -ES           User Key  (r) = Recorded By, (t) = Taken By, (c) = Cosigned By    Initials Name Provider Type    Marsha Phillip, OT Occupational Therapist               Outcome Measures     Row Name 07/08/22 1501          How much help from another is currently needed...    Putting on and taking off regular lower body clothing? 4  -ES     Bathing (including washing, rinsing, and drying) 4  -ES     Toileting (which includes using toilet bed pan or urinal) 4  -ES     Putting on and taking off regular upper body clothing 4  -ES     Taking care of personal grooming (such as brushing teeth) 4  -ES     Eating meals 4  -ES     AM-PAC 6 Clicks Score (OT) 24  -ES     Row Name 07/08/22 0800          How much help from another person do you currently need...    Turning from your back to your side while in flat bed without using bedrails? 4  -DB     Moving from lying on back to sitting on the side of a flat bed without bedrails? 4  -DB     Moving to and from a bed to a chair (including a wheelchair)? 3  -DB     Standing up from a chair using your arms (e.g., wheelchair, bedside chair)? 3  -DB     Climbing 3-5 steps with a railing? 3  -DB     To walk in hospital room? 3  -DB     AM-PAC 6  Clicks Score (PT) 20  -DB     Highest level of mobility 6 --> Walked 10 steps or more  -DB     Row Name 07/08/22 1501          Functional Assessment    Outcome Measure Options AM-PAC 6 Clicks Daily Activity (OT);Optimal Instrument  -ES     Row Name 07/08/22 1501          Optimal Instrument    Optimal Instrument Optimal - 3  -ES     Bending/Stooping 1  -ES     Standing 1  -ES     Reaching 1  -ES     From the list, choose the 3 activities you would most like to be able to do without any difficulty Bending/stooping;Standing;Reaching  -ES     Total Score Optimal - 3 3  -ES           User Key  (r) = Recorded By, (t) = Taken By, (c) = Cosigned By    Initials Name Provider Type    ES Marsha Dudley, GHADA Occupational Therapist    DB Aleksandra Rios, RN Registered Nurse                Occupational Therapy Education                 Title: PT OT SLP Therapies (Done)     Topic: Occupational Therapy (Done)     Point: ADL training (Done)     Description:   Instruct learner(s) on proper safety adaptation and remediation techniques during self care or transfers.   Instruct in proper use of assistive devices.              Learning Progress Summary           Patient Acceptance, E,TB, VU by  at 7/8/2022 1504                   Point: Home exercise program (Done)     Description:   Instruct learner(s) on appropriate technique for monitoring, assisting and/or progressing therapeutic exercises/activities.              Learning Progress Summary           Patient Acceptance, E,TB, VU by  at 7/8/2022 1504                   Point: Precautions (Done)     Description:   Instruct learner(s) on prescribed precautions during self-care and functional transfers.              Learning Progress Summary           Patient Acceptance, E,TB, VU by  at 7/8/2022 1504                   Point: Body mechanics (Done)     Description:   Instruct learner(s) on proper positioning and spine alignment during self-care, functional mobility activities and/or  exercises.              Learning Progress Summary           Patient Acceptance, E,TB, VU by LOR at 7/8/2022 1504                               User Key     Initials Effective Dates Name Provider Type Discipline     09/13/21 -  Marsha Dudley OT Occupational Therapist OT              OT Recommendation and Plan  Therapy Frequency (OT): evaluation only  Plan of Care Review  Plan of Care Reviewed With: patient  Progress: no change (initial evaluation encounter)  Outcome Evaluation: Patient presents at or near baseline functional status with no deficits related to strength, balance, tolerance, safety awareness, transfers or mobility that impede patient independence with activities of daily living. No indicated need for skilled OT intervention in the acute care setting. OT will sign off at this time.     Time Calculation:    Time Calculation- OT     Row Name 07/08/22 1505             Time Calculation- OT    OT Received On 07/08/22  -ES              Untimed Charges    OT Eval/Re-eval Minutes 32  -ES              Total Minutes    Untimed Charges Total Minutes 32  -ES       Total Minutes 32  -ES            User Key  (r) = Recorded By, (t) = Taken By, (c) = Cosigned By    Initials Name Provider Type     Marsha Dudley OT Occupational Therapist              Therapy Charges for Today     Code Description Service Date Service Provider Modifiers Qty    81201089943 HC OT EVAL LOW COMPLEXITY 3 7/8/2022 Marsha Dudley OT GO 1               Marsha Dudley OT  7/8/2022

## 2022-07-08 NOTE — PROGRESS NOTES
" [facility]     Psychiatric Progress Note    Patient Name: Willow Khan  : 1980  MRN: [id number]  Primary Care Physician:  Provider, No Known  Date of admission: 2022    Subjective   Subjective     Chief Complaint: Psychosis      HPI:     Patient seen and chart reviewed, discussed with staff.    Patient was admitted to the medical floor secondary to increasing creatinine kinase.  He received IV fluids and creatinine kinase is trending downward.  Renal function is normal.    Patient yesterday received medications for acute agitation and psychosis.  He was difficult to arouse and unable to participate in interview yesterday.  This morning he is awake, alert, oriented, states that he feels fairly well.  He is calm and cooperative in interview.  There is no psychomotor restlessness or agitation.  He has appropriate interactions.  He is fully oriented.  Patient states that he does feel kind of tired today.  He remembers coming in because his hands and feet hurt and he did have a fall after trying to climb a fence.  He was climbing the fence due to paranoia and some delusional thinking.  He was intoxicated on methamphetamine at the time.  Patient is detoxed this time and is no acute psychosis noted during the interview.  He is denying any psychosis.  There is no delusional or paranoid thinking elicited during the conversation.    Patient is calm and pleasant and makes good eye contact.  He is denying suicidal or homicidal ideation.  Reports that he slept well last night.  Reports that his mood is \"stable\" he has no suicidal or homicidal ideations.  Knowledge is that he has relapsed on substances and wants to get back to drug and alcohol rehabilitation.  [facility] the past completed her program a number of months ago.  He is hopeful to get back into drug and alcohol rehabilitation.    Patient detoxified from methamphetamine and no acute psychosis noted.  May discharge to " "rehab.    Started on Risperdal for mood stabilization and psychosis and will continue at discharge.      Objective   Objective     Vitals:   Temp:  [98.1 °F (36.7 °C)-99.2 °F (37.3 °C)] 98.7 °F (37.1 °C)  Heart Rate:  [78-82] 78  Resp:  [16-18] 18  BP: (116-145)/(68-82) 116/82          Mental Status Exam:      Appearance:   Well-developed, well-nourished, calm, cooperative, makes good eye contact, participates fully in exam, with no restlessness or agitation, ADLs well attended to  Reliability:   Good  Eye Contact:   Good  Concentration/Focus:    Attentive to the interview, good focus  Behaviors:    No restlessness or agitation  Memory :    Sensorium intact, no deficits  Speech:    Normal rate and volume  Language:   Appropriate, relevant  Mood :    \"Stable\"  Affect:    Slightly constricted but overall euthymic  Thought process:    Sequential, goal-directed, linear, no paranoia or delusions elicited  Thought Content:    Denies suicidal or homicidal ideation, denies auditory visual hallucinations and does not appear to be responding to internal stimuli  Insight:   Appears intact and good  Judgement:    No behavioral disturbance      Result Review    Result Review:  I have personally reviewed the results from the time of this admission to 7/8/2022 12:23 EDT and agree with these findings:  []  Laboratory  []  Microbiology  []  Radiology  []  EKG/Telemetry   []  Cardiology/Vascular   []  Pathology  []  Old records  []  Other:  Most notable findings include:     Medications:   enoxaparin, 40 mg, Subcutaneous, Daily  mupirocin, 1 application, Topical, Q12H  risperiDONE, 3 mg, Oral, Nightly          Assessment / Plan       Active Hospital Problems:  Active Hospital Problems    Diagnosis    • Rhabdomyolysis        Plan:     • Patient reports a stable mood is free of suicidal or homicidal ideation and wants rehab and may discharge to substance use treatment programming  • Psychosis resolved with Risperdal and methamphetamine " detoxification  • Continue Risperdal for mood stabilization and any residual psychosis  • Does not need acute inpatient psychiatric care at this time is willing to go to rehab and referral to [facility] being made and if this accepted to rehab may discharge to rehab        Disposition: Drug and alcohol rehabilitation    Electronically signed by Mikal Alegre MD, 07/08/22, 12:23 PM EDT.

## 2022-07-08 NOTE — CASE MANAGEMENT/SOCIAL WORK
Discharge Planning Assessment   [facility]     Patient Name: Willow Khan  MRN: [id number]  Today's Date: 7/8/2022    Admit Date: 7/6/2022     Discharge Needs Assessment     Row Name 07/08/22 0908       Living Environment    People in Home alone    Unique Family Situation Pt is homeless    Current Living Arrangements homeless    Potentially Unsafe Housing Conditions other (see comments)  Pt is homeless    Provides Primary Care For no one    Family Caregiver if Needed none    Quality of Family Relationships non-existent       Resource/Environmental Concerns    Resource/Environmental Concerns home accessibility;financial;reliable transportation    Financial Concerns food, unable to afford;unemployed       Transition Planning    Patient/Family Anticipates Transition to other (see comments)  Pt is homeless and unsure of his discharge needs    Patient/Family Anticipated Services at Transition        Discharge Needs Assessment    Readmission Within the Last 30 Days no previous admission in last 30 days    Equipment Currently Used at Home none    Anticipated Changes Related to Illness none    Equipment Needed After Discharge none    Discharge Coordination/Progress Pt states he has been vaccinated for Covid.  Pt does not have a PCP, Pt states he would use [facility] if needed. Pt is unemployed, Pt has a court date with [place], on 6/11/22. SW will need to alert [place]. court system if Pt is still in the hospital at this time. Pt states he does not have any family support. SW will continue to follow for discharge needs.               Discharge Plan     Row Name 07/08/22 0912       Plan    Plan Pt states he has been vaccinated for Covid.  Pt does not have a PCP, Pt states he would use [facility] if needed. Pt is unemployed, Pt has a court date with [place], on 6/11/22. SW will need to alert [place]. court system if Pt is still in the hospital at this time. Pt states he does not have any family support. [facility]  will continue to follow for discharge needs              Continued Care and Services - Admitted Since 7/6/2022    Coordination has not been started for this encounter.          Demographic Summary     Row Name 07/08/22 0905       General Information    Admission Type inpatient    Arrived From emergency department    Referral Source admission list    Reason for Consult discharge planning    Preferred Language English       Contact Information    Permission Granted to Share Info With permission denied               Functional Status     Row Name 07/08/22 0906       Functional Status    Usual Activity Tolerance excellent    Current Activity Tolerance excellent       Assessment of Health Literacy    How often do you have someone help you read hospital materials? Always    How often do you have problems learning about your medical condition because of difficulty understanding written information? Always    How often do you have a problem understanding what is told to you about your medical condition? Always    How confident are you filling out medical forms by yourself? Extremely    Health Literacy Excellent       Functional Status, IADL    Medications independent    Meal Preparation independent    Housekeeping independent    Laundry independent    Shopping independent       Employment/    Employment Status unemployed               Psychosocial    No documentation.                Abuse/Neglect    No documentation.                Legal     Row Name 07/08/22 0906       Financial/Legal    Source of Income none    Application for Public Assistance not applied       Legal    Criminal Activity/Legal Involvement other (see comments)  Pt has court on 6/11/22 with [place].               Substance Abuse     Row Name 07/08/22 0907       Substance Use    Substance Use Status never used               Patient Forms    No documentation.                   Ayaka Torres

## 2022-07-08 NOTE — PLAN OF CARE
Goal Outcome Evaluation:       Patient has been cooperative and resting most of shift. Originally stated he wanted to go to rehab when he is discharged for substance abuse but now states he would like to go home with his mom. Wound care completed and dressing changed on right knee abrasion. 72 hour hold and safety watch discontinued. No significant changes in patient condition this shift.

## 2022-07-08 NOTE — CASE MANAGEMENT/SOCIAL WORK
Patient shared he would like to discharge to a substance abuse inpatient program.  discussed local options, [facility] and [facility]. Patient is agreeable to referrals, preference is [facility].

## 2022-07-08 NOTE — PLAN OF CARE
Goal Outcome Evaluation:  Plan of Care Reviewed With: patient           Outcome Evaluation: Patient presents with decreased strength, balance, and functional mobility.  He will benefit from skilled PT services to address these deficits.

## 2022-07-08 NOTE — PLAN OF CARE
Goal Outcome Evaluation:  Plan of Care Reviewed With: patient        Progress: no change (initial evaluation encounter)  Outcome Evaluation: Patient presents at or near baseline functional status with no deficits related to strength, balance, tolerance, safety awareness, transfers or mobility that impede patient independence with activities of daily living. No indicated need for skilled OT intervention in the acute care setting. OT will sign off at this time.

## 2022-07-09 VITALS
HEIGHT: 73 IN | RESPIRATION RATE: 16 BRPM | HEART RATE: 85 BPM | SYSTOLIC BLOOD PRESSURE: 135 MMHG | BODY MASS INDEX: 26.41 KG/M2 | DIASTOLIC BLOOD PRESSURE: 75 MMHG | TEMPERATURE: 98.5 F | OXYGEN SATURATION: 99 % | WEIGHT: 199.3 LBS

## 2022-07-09 PROCEDURE — 25010000002 ENOXAPARIN PER 10 MG: Performed by: INTERNAL MEDICINE

## 2022-07-09 PROCEDURE — 99239 HOSP IP/OBS DSCHRG MGMT >30: CPT | Performed by: INTERNAL MEDICINE

## 2022-07-09 PROCEDURE — 97116 GAIT TRAINING THERAPY: CPT

## 2022-07-09 RX ORDER — RISPERIDONE 3 MG/1
3 TABLET ORAL NIGHTLY
Qty: 30 TABLET | Refills: 0 | Status: SHIPPED | OUTPATIENT
Start: 2022-07-09 | End: 2022-07-09 | Stop reason: SDUPTHER

## 2022-07-09 RX ORDER — RISPERIDONE 3 MG/1
3 TABLET ORAL NIGHTLY
Qty: 30 TABLET | Refills: 0 | Status: SHIPPED | OUTPATIENT
Start: 2022-07-09 | End: 2022-08-08

## 2022-07-09 RX ADMIN — MUPIROCIN 1 APPLICATION: 20 OINTMENT TOPICAL at 09:06

## 2022-07-09 RX ADMIN — ENOXAPARIN SODIUM 40 MG: 100 INJECTION SUBCUTANEOUS at 08:55

## 2022-07-09 NOTE — PLAN OF CARE
Problem: Adult Inpatient Plan of Care  Goal: Plan of Care Review  Outcome: Ongoing, Progressing  Flowsheets (Taken 7/9/2022 1402)  Outcome Evaluation: Patient up  and walking in halls with staff. To do the intake appointment for [facility] per Social Work. States he is still a little sore but improving. Appetite Good. Vital Signs Stable.  Goal: Patient-Specific Goal (Individualized)  Outcome: Ongoing, Progressing  Goal: Absence of Hospital-Acquired Illness or Injury  Outcome: Ongoing, Progressing  Intervention: Identify and Manage Fall Risk  Recent Flowsheet Documentation  Taken 7/9/2022 0845 by Brooke Ivan, RN  Safety Promotion/Fall Prevention: safety round/check completed  Intervention: Prevent and Manage VTE (Venous Thromboembolism) Risk  Recent Flowsheet Documentation  Taken 7/9/2022 0845 by Brooke Ivan, RN  Activity Management: activity adjusted per tolerance  Range of Motion: active ROM (range of motion) encouraged  Goal: Optimal Comfort and Wellbeing  Outcome: Ongoing, Progressing  Goal: Readiness for Transition of Care  Outcome: Ongoing, Progressing     Problem: Skin Injury Risk Increased  Goal: Skin Health and Integrity  Outcome: Ongoing, Progressing  Intervention: Optimize Skin Protection  Recent Flowsheet Documentation  Taken 7/9/2022 0845 by Brooke Ivan, RN  Pressure Reduction Techniques: frequent weight shift encouraged   Goal Outcome Evaluation:              Outcome Evaluation: Patient up  and walking in halls with staff. To do the intake appointment for [facility] per Social Work. States he is still a little sore but improving. Appetite Good. Vital Signs Stable.

## 2022-07-09 NOTE — PLAN OF CARE
Problem: Adult Inpatient Plan of Care  Goal: Plan of Care Review  7/9/2022 1726 by Brooke Ivan RN  Outcome: Adequate for Care Transition  7/9/2022 1726 by Brooke Ivan RN  Outcome: Ongoing, Progressing  7/9/2022 1402 by Brooke Ivan RN  Outcome: Ongoing, Progressing  Flowsheets (Taken 7/9/2022 1402)  Outcome Evaluation: Patient up  and walking in halls with staff. To do the intake appointment for [facility] per Social Work. States he is still a little sore but improving. Appetite Good. Vital Signs Stable.  Goal: Patient-Specific Goal (Individualized)  7/9/2022 1726 by Brooke Ivan RN  Outcome: Adequate for Care Transition  7/9/2022 1726 by Brooke Ivan RN  Outcome: Ongoing, Progressing  7/9/2022 1402 by Brooke Ivan RN  Outcome: Ongoing, Progressing  Goal: Absence of Hospital-Acquired Illness or Injury  7/9/2022 1726 by Brooke Ivan RN  Outcome: Adequate for Care Transition  7/9/2022 1726 by Brooke Ivan RN  Outcome: Ongoing, Progressing  7/9/2022 1402 by Brooke Ivan RN  Outcome: Ongoing, Progressing  Intervention: Identify and Manage Fall Risk  Recent Flowsheet Documentation  Taken 7/9/2022 0845 by Brooke Ivan RN  Safety Promotion/Fall Prevention: safety round/check completed  Intervention: Prevent and Manage VTE (Venous Thromboembolism) Risk  Recent Flowsheet Documentation  Taken 7/9/2022 0845 by Brooke Ivan RN  Activity Management: activity adjusted per tolerance  Range of Motion: active ROM (range of motion) encouraged  Goal: Optimal Comfort and Wellbeing  7/9/2022 1726 by Brooke Ivan RN  Outcome: Adequate for Care Transition  7/9/2022 1726 by Brooke Ivan RN  Outcome: Ongoing, Progressing  7/9/2022 1402 by Brooke Ivan RN  Outcome: Ongoing, Progressing  Goal: Readiness for Transition of Care  7/9/2022 1726 by Brooke Ivan RN  Outcome: Adequate for Care Transition  7/9/2022 1726 by Brooke Ivan RN  Outcome: Ongoing,  Progressing  7/9/2022 1402 by Brooke Ivan, NENA  Outcome: Ongoing, Progressing     Problem: Skin Injury Risk Increased  Goal: Skin Health and Integrity  7/9/2022 1726 by Brooke Ivan, RN  Outcome: Adequate for Care Transition  7/9/2022 1726 by Brooke Ivan, RN  Outcome: Ongoing, Progressing  7/9/2022 1402 by Brooke Ivan, NENA  Outcome: Ongoing, Progressing  Intervention: Optimize Skin Protection  Recent Flowsheet Documentation  Taken 7/9/2022 0845 by Brooke Ivan, RN  Pressure Reduction Techniques: frequent weight shift encouraged   Goal Outcome Evaluation:              Outcome Evaluation: Patient up  and walking in halls with staff. To do the intake appointment for [facility] per Social Work. States he is still a little sore but improving. Appetite Good. Vital Signs Stable.

## 2022-07-09 NOTE — DISCHARGE SUMMARY
[facility]         HOSPITALIST  DISCHARGE SUMMARY    Patient Name: Willow Khan    : 1980    MRN: [id number]    Date of Admission: 2022  Date of Discharge:  22  Primary Care Physician: Provider, No Known    Consults     Date and Time Order Name Status Description    2022  7:43 AM Inpatient Psychiatrist Consult Completed     2022  7:30 AM Hospitalist (on-call MD unless specified)            Final Diagnosis:  ISAURA  Rhabdomyolysis  Methamphetamine Abuse  Acute psychosis possibly secondary to above with possible underlying mood disorder.  Normocytic Anemia       Hospital Course     Hospital Course:  41 y.o. male who was brought in for bizarre and altered mental status and behavior.  Patient withDisoriented thoughts and communication and did end up requiring Haldol the emergency department.  Patient found to have acute psychosis, amphetamine use.  Initial work-up also notable for rhabdomyolysis and ISAURA.  Patient is homeless and has been intermittently in and out of rehab at [facility].  ISAURA and rhabdo resolved with supportive care with fluids.    A bed was not readily available and patient was worried about a follow-up court appointment needed on Monday.  He opted for discharging home with plan to follow-up as outpatient.  He was willing to continue on Risperdal which was prescribed for discharge.    Patient discharged in stable condition with close PCP follow up.   Return precautions and follow up discussed and patient voiced agreement and understanding of treatment plan.       Recommend follow up hemoglobin as outpatient and additional workup/routine screening as indicated.        CODE STATUS:  Code Status and Medical Interventions:   Ordered at: 22 0745     Code Status (Patient has no pulse and is not breathing):    CPR (Attempt to Resuscitate)     Medical Interventions (Patient has pulse or is breathing):    Full Support           Day of Discharge     Vital  Signs:  Temp:  [97.8 °F (36.6 °C)-99.1 °F (37.3 °C)] 98.5 °F (36.9 °C)  Heart Rate:  [76-85] 85  Resp:  [16-18] 16  BP: (122-135)/(68-81) 135/75    Physical Exam  Gen: awake, resting in bed, conversant  HENT: NCAT, mmm  Resp: CTAB, normal respiratory effort  CV: RRR, no LE pitting edema  GI: Abdomen soft, NT, ND, no guarding, +BS  Psych: fair mood and affect, aox3, no si/hi, no acute psychosis, hallucinations or paranoid delusions   Skin: warm, dry        Discharge Details        Discharge Medications      New Medications      Instructions Start Date   risperiDONE 3 MG tablet  Commonly known as: risperDAL   3 mg, Oral, Nightly               Discharge Disposition:  Home or Self Care    Diet: advance as tolerated     Discharge Activity: advance as tolerated    No future appointments.    Additional Instructions for the Follow-ups that You Need to Schedule     Discharge Follow-up with PCP   As directed       Currently Documented PCP:    Provider, No Known    PCP Phone Number:    [phone]     Follow Up Details: 3-5 days, rhabdo, methamphetamine use, psychosis               Pertinent  and/or Most Recent Results       LAB RESULTS:      Lab 07/08/22  0438 07/06/22  2319   WBC 4.64 9.87   HEMOGLOBIN 11.3* 12.3*   HEMATOCRIT 34.1* 36.1*   PLATELETS 260 286   NEUTROS ABS 2.36 7.88*   IMMATURE GRANS (ABS) 0.01 0.03   LYMPHS ABS 1.55 1.01   MONOS ABS 0.47 0.84   EOS ABS 0.21 0.05   MCV 86.8 84.5         Lab 07/08/22  0438 07/06/22  2319   SODIUM 141 143   POTASSIUM 3.6 3.8   CHLORIDE 111* 106   CO2 21.1* 23.0   ANION GAP 8.9 14.0   BUN 9 18   CREATININE 0.80 1.28*   EGFR 112.6 71.2   GLUCOSE 104* 107*   CALCIUM 8.3* 9.6   MAGNESIUM 2.0  --    PHOSPHORUS 3.3  --          Lab 07/06/22  2319   TOTAL PROTEIN 7.2   ALBUMIN 4.50   GLOBULIN 2.7   ALT (SGPT) 27   AST (SGOT) 44*   BILIRUBIN 0.7   ALK PHOS 72                     Brief Urine Lab Results  (Last result in the past 365 days)      Color   Clarity   Blood   Leuk Est    Nitrite   Protein   CREAT   Urine HCG        07/07/22 0551 Yellow   Clear   Negative   Negative   Negative   Trace               Microbiology Results (last 10 days)     ** No results found for the last 240 hours. **          RADIOLOGY:    XR Knee 1 or 2 View Right    Result Date: 7/7/2022  Impression:   No acute fracture or acute malalignment is identified on this single AP view of the right knee.  Please see above comments for further detail.     COMMENT:  Part of this note is an electronic transcription of spoken language to printed text. The electronic translation/transcription may permit erroneous, or at times, nonsensical (or even sensical) words or phrases to be inadvertently transcribed or omitted; this  has reviewed the note for such errors (as well as additional errors); however, some may still exist.  LORI ZHANG JR, MD       Electronically Signed and Approved By: LORI ZHANG JR, MD on 7/07/2022 at 2:36                            Labs Pending at Discharge:        Time spent on Discharge including face to face service:  38 minutes

## 2022-07-09 NOTE — PROGRESS NOTES
[facility]   Hospitalist Progress Note    Date of admission: 7/6/2022  Patient Name: Willow Khan  1980  Date: 7/8/2022      Subjective     Chief Complaint   Patient presents with   • Psychiatric Evaluation   • Dehydration       Summary:  41 y.o. male who was brought in for bizarre and altered mental status and behavior.  Patient withDisoriented thoughts and communication and did end up requiring Haldol the emergency department.  Patient found to have acute psychosis, amphetamine use.  Initial work-up also notable for rhabdomyolysis and ISAURA.  Patient is homeless and has been intermittently in and out of rehab at [facility].  He is willing to return there at discharge.    Interval Followup: No acute events overnight.  Denying any acute hallucinations SI or HI.  Still having some aching in his feet bilaterally but improving compared to yesterday.  No dysuria.  No flank pain.  No other acute complaints currently.  Patient [facility] at [facility].  Tolerating increasing p.o. intake and fluids.    Review of Systems  No chest pain or palpitations  No fevers or chills    Objective     Vitals:   Temp:  [98.1 °F (36.7 °C)-99.2 °F (37.3 °C)] 99.1 °F (37.3 °C)  Heart Rate:  [76-82] 76  Resp:  [16-18] 16  BP: (116-145)/(68-82) 128/78    Physical Exam  Gen: awake, resting in bed, conversant  HENT: NCAT, mmm  Resp: CTA B, no wheezes rhonchi or rales  CV: RRR, no LE pitting edema  GI: Abdomen soft, NT, ND, no guarding, +BS  Psych: Fair mood and affect, aox3 no SI or HI  Skin: warm, dry    Result Review:  Vital signs, labs and any relevant imaging reviewed.        Assessment / Plan     Assessment/Plan:  ISAURA  Rhabdomyolysis  Methamphetamine Abuse  Acute psychosis possibly secondary to above with possible underlying mood disorder.  Normocytic Anemia    -ISAURA resolving with IV fluids.  Continue LR  - CK downtrending, continue fluids and increasing p.o. intake.  - Appreciate psychiatry support.   Continue on Risperdal to support for mood stabilization and any residual psychosis from amphetamine abuse.  No acute inpatient psychiatric rehab recommended.  -Will continue Risperdal at discharge.  -No blood loss, suspect a component of dilution secondary to fluid.  Follow hemoglobin as outpatient   -Patient initially wanting to return home to stay with his mother but after discussion with her is in agreement with rehab placement.  -Will get rid of one-to-one observation given improving mentation.  Continue to monitor for delirium and psychosis.    -Should be stable for [facility] if [facility] is available.    -Rest per orders.      DVT prophylaxis:  Medical DVT prophylaxis orders are present.    Code Status (Patient has no pulse and is not breathing): CPR (Attempt to Resuscitate)  Medical Interventions (Patient has pulse or is breathing): Full Support        CBC    CBC 7/6/22 7/8/22   WBC 9.87 4.64   RBC 4.27 3.93 (A)   Hemoglobin 12.3 (A) 11.3 (A)   Hematocrit 36.1 (A) 34.1 (A)   MCV 84.5 86.8   MCH 28.8 28.8   MCHC 34.1 33.1   RDW 13.4 13.8   Platelets 286 260   (A) Abnormal value              CMP    CMP 7/6/22 7/8/22   Glucose 107 (A) 104 (A)   BUN 18 9   Creatinine 1.28 (A) 0.80   Sodium 143 141   Potassium 3.8 3.6   Chloride 106 111 (A)   Calcium 9.6 8.3 (A)   Albumin 4.50    Total Bilirubin 0.7    Alkaline Phosphatase 72    AST (SGOT) 44 (A)    ALT (SGPT) 27    (A) Abnormal value              Dispo: pending stability in clinical condition and appropriate discharge location.

## 2022-07-09 NOTE — PLAN OF CARE
Problem: Adult Inpatient Plan of Care  Goal: Plan of Care Review  7/9/2022 1726 by Brooke Ivan RN  Outcome: Ongoing, Progressing  7/9/2022 1402 by Brooke Ivan RN  Outcome: Ongoing, Progressing  Flowsheets (Taken 7/9/2022 1402)  Outcome Evaluation: Patient up  and walking in halls with staff. To do the intake appointment for [facility] per Social Work. States he is still a little sore but improving. Appetite Good. Vital Signs Stable.  Goal: Patient-Specific Goal (Individualized)  7/9/2022 1726 by Brooke Ivan RN  Outcome: Ongoing, Progressing  7/9/2022 1402 by Brooke Ivan RN  Outcome: Ongoing, Progressing  Goal: Absence of Hospital-Acquired Illness or Injury  7/9/2022 1726 by Brooke Ivan RN  Outcome: Ongoing, Progressing  7/9/2022 1402 by Brooke Ivan RN  Outcome: Ongoing, Progressing  Intervention: Identify and Manage Fall Risk  Recent Flowsheet Documentation  Taken 7/9/2022 0845 by Brooke Ivan RN  Safety Promotion/Fall Prevention: safety round/check completed  Intervention: Prevent and Manage VTE (Venous Thromboembolism) Risk  Recent Flowsheet Documentation  Taken 7/9/2022 0845 by Brooke Ivan RN  Activity Management: activity adjusted per tolerance  Range of Motion: active ROM (range of motion) encouraged  Goal: Optimal Comfort and Wellbeing  7/9/2022 1726 by Brooke Ivan RN  Outcome: Ongoing, Progressing  7/9/2022 1402 by Brooke Ivan RN  Outcome: Ongoing, Progressing  Goal: Readiness for Transition of Care  7/9/2022 1726 by Brooke Ivan RN  Outcome: Ongoing, Progressing  7/9/2022 1402 by Brooke Ivan RN  Outcome: Ongoing, Progressing     Problem: Skin Injury Risk Increased  Goal: Skin Health and Integrity  7/9/2022 1726 by Brooke Ivan RN  Outcome: Ongoing, Progressing  7/9/2022 1402 by Brooke Ivan RN  Outcome: Ongoing, Progressing  Intervention: Optimize Skin Protection  Recent Flowsheet Documentation  Taken 7/9/2022 0845 by  Brooke Ivan, RN  Pressure Reduction Techniques: frequent weight shift encouraged   Goal Outcome Evaluation:              Outcome Evaluation: Patient up  and walking in halls with staff. To do the intake appointment for [facility] per Social Work. States he is still a little sore but improving. Appetite Good. Vital Signs Stable.

## 2022-07-09 NOTE — PLAN OF CARE
Goal Outcome Evaluation:  Plan of Care Reviewed With: patient        Progress: improving  Outcome Evaluation: Patient stable throughout shift. No concerns or complaints notes.

## 2022-07-09 NOTE — THERAPY TREATMENT NOTE
Acute Care - Physical Therapy Treatment Note  [facility]     Patient Name: Willow Khan  : 1980  MRN: [id number]  Today's Date: 2022      Visit Dx:     ICD-10-CM ICD-9-CM   1. Amphetamine abuse (HCC)  F15.10 305.70   2. Non-traumatic rhabdomyolysis  M62.82 728.88   3. Psychosis, unspecified psychosis type (HCC)  F29 298.9   4. Decreased activities of daily living (ADL)  Z78.9 V49.89   5. Difficulty walking  R26.2 719.7     Patient Active Problem List   Diagnosis   • Rhabdomyolysis     No past medical history on file.  No past surgical history on file.  PT Assessment (last 12 hours)     PT Evaluation and Treatment     Row Name 22 1436          Physical Therapy Time and Intention    Subjective Information complains of;pain  -TS     Document Type therapy note (daily note)  -TS     Mode of Treatment individual therapy;physical therapy  -TS     Row Name 22 1436          Pain    Pretreatment Pain Rating 10/10  -TS     Pain Location - Side/Orientation Bilateral  -TS     Pain Location - foot  -TS     Row Name 22 1436          Cognition    Affect/Mental Status (Cognition) WFL  -TS     Row Name 22 1436          Bed Mobility    Bed Mobility supine-sit-supine  -TS     Supine-Sit Johnson (Bed Mobility) independent  -TS     Sit-Supine Johnson (Bed Mobility) independent  -TS     Row Name 22 1436          Transfers    Transfers sit-stand transfer;stand-sit transfer  -TS     Sit-Stand Johnson (Transfers) minimum assist (75% patient effort)  -TS     Stand-Sit Johnson (Transfers) minimum assist (75% patient effort)  -TS     Row Name 22 1436          Sit-Stand Transfer    Assistive Device (Sit-Stand Transfers) walker, front-wheeled  -TS     Row Name 22 1436          Stand-Sit Transfer    Assistive Device (Stand-Sit Transfers) walker, front-wheeled  -TS     Row Name 22 1436          Gait/Stairs (Locomotion)    Gait/Stairs Locomotion gait/ambulation  assistive device  -TS     [place] Level (Gait) contact guard  -TS     Assistive Device (Gait) walker, front-wheeled  -TS     Distance in Feet (Gait) 25  -TS     Row Name 07/09/22 1436          Safety Issues, Functional Mobility    Impairments Affecting Function (Mobility) pain;endurance/activity tolerance  -TS     Row Name 07/09/22 1436          Motor Skills    Therapeutic Exercise hip;knee;ankle  AROM x20 reps, supine position  -TS     Row Name 07/09/22 1436          Positioning and Restraints    Pre-Treatment Position in bed  -TS     Post Treatment Position bed  -TS     In Bed supine;call light within reach;encouraged to call for assist;heels elevated  -TS     Row Name 07/09/22 1436          Progress Summary (PT)    Progress Toward Functional Goals (PT) progress toward functional goals is fair  -TS           User Key  (r) = Recorded By, (t) = Taken By, (c) = Cosigned By    Initials Name Provider Type    TS Doug Leslie, PEPPER Physical Therapist Assistant            Bilateral Knee Ther-ex   Exercise  Reps  Sets    Hip ab/adduction  10 2   Heel slides 10 2                                       PT Recommendation and Plan     Progress Summary (PT)  Progress Toward Functional Goals (PT): progress toward functional goals is fair   Outcome Measures     Row Name 07/09/22 1443 07/08/22 1526          How much help from another person do you currently need...    Turning from your back to your side while in flat bed without using bedrails? 4  -TS 4  -DP     Moving from lying on back to sitting on the side of a flat bed without bedrails? 4  -TS 4  -DP     Moving to and from a bed to a chair (including a wheelchair)? 3  -TS 3  -DP     Standing up from a chair using your arms (e.g., wheelchair, bedside chair)? 3  -TS 3  -DP     Climbing 3-5 steps with a railing? 3  -TS 3  -DP     To walk in hospital room? 3  -TS 3  -DP     AM-PAC 6 Clicks Score (PT) 20  -TS 20  -DP            Functional Assessment    Outcome Measure  Options -- AM-PAC 6 Clicks Basic Mobility (PT)  -DP           User Key  (r) = Recorded By, (t) = Taken By, (c) = Cosigned By    Initials Name Provider Type    Doug Delacruz PTA Physical Therapist Assistant    Mahendra Walker, PT Physical Therapist                 Time Calculation:    PT Charges     Row Name 07/09/22 1434             Time Calculation    PT Received On 07/09/22  -TS      PT Goal Re-Cert Due Date 07/17/22  -TS              Timed Charges    83504 - PT Therapeutic Exercise Minutes 3  -TS      37369 - Gait Training Minutes  4  -TS      62457 - PT Therapeutic Activity Minutes 3  -TS              Total Minutes    Timed Charges Total Minutes 10  -TS       Total Minutes 10  -TS            User Key  (r) = Recorded By, (t) = Taken By, (c) = Cosigned By    Initials Name Provider Type    Doug Delacruz PTA Physical Therapist Assistant              Therapy Charges for Today     Code Description Service Date Service Provider Modifiers Qty    [id number] HC GAIT TRAINING EA 15 MIN 7/9/2022 Doug Leslie PTA GP 1          PT G-Codes  Outcome Measure Options: AM-PAC 6 Clicks Basic Mobility (PT)  AM-PAC 6 Clicks Score (PT): 20  AM-PAC 6 Clicks Score (OT): 24    Doug Leslie PTA  7/9/2022

## 2022-07-12 ENCOUNTER — READMISSION MANAGEMENT (OUTPATIENT)
Dept: CALL CENTER | Facility: HOSPITAL | Age: 42
End: 2022-07-12

## 2022-07-12 NOTE — OUTREACH NOTE
Prep Survey    Flowsheet Row Responses   [facility] patient discharged from? [facility]   Is LACE score < 7 ? Yes   Emergency Room discharge w/ pulse ox? No   Eligibility Not Eligible  [Low risk for readmission]   What are the reasons patient is not eligible? Other   Does the patient have one of the following disease processes/diagnoses(primary or secondary)? Other   Prep survey completed? Yes          KHADIJAH GONZALES - Registered Nurse